# Patient Record
Sex: MALE | Race: WHITE | NOT HISPANIC OR LATINO | Employment: FULL TIME | URBAN - METROPOLITAN AREA
[De-identification: names, ages, dates, MRNs, and addresses within clinical notes are randomized per-mention and may not be internally consistent; named-entity substitution may affect disease eponyms.]

---

## 2017-01-31 ENCOUNTER — ALLSCRIPTS OFFICE VISIT (OUTPATIENT)
Dept: OTHER | Facility: OTHER | Age: 57
End: 2017-01-31

## 2017-08-10 ENCOUNTER — GENERIC CONVERSION - ENCOUNTER (OUTPATIENT)
Dept: OTHER | Facility: OTHER | Age: 57
End: 2017-08-10

## 2018-01-22 VITALS
DIASTOLIC BLOOD PRESSURE: 68 MMHG | HEART RATE: 63 BPM | HEIGHT: 72 IN | BODY MASS INDEX: 29.12 KG/M2 | WEIGHT: 215 LBS | SYSTOLIC BLOOD PRESSURE: 130 MMHG

## 2018-01-22 VITALS
HEART RATE: 54 BPM | DIASTOLIC BLOOD PRESSURE: 80 MMHG | SYSTOLIC BLOOD PRESSURE: 118 MMHG | BODY MASS INDEX: 29.12 KG/M2 | HEIGHT: 72 IN | WEIGHT: 215 LBS

## 2018-01-30 ENCOUNTER — OFFICE VISIT (OUTPATIENT)
Dept: FAMILY MEDICINE CLINIC | Facility: CLINIC | Age: 58
End: 2018-01-30

## 2018-02-20 PROBLEM — Z02.89 ENCOUNTER FOR FEDERAL AVIATION ADMINISTRATION (FAA) EXAMINATION: Status: ACTIVE | Noted: 2018-02-20

## 2018-08-30 ENCOUNTER — OFFICE VISIT (OUTPATIENT)
Dept: FAMILY MEDICINE CLINIC | Facility: CLINIC | Age: 58
End: 2018-08-30

## 2018-08-30 VITALS
WEIGHT: 210 LBS | DIASTOLIC BLOOD PRESSURE: 70 MMHG | HEIGHT: 74 IN | BODY MASS INDEX: 26.95 KG/M2 | SYSTOLIC BLOOD PRESSURE: 132 MMHG | HEART RATE: 53 BPM

## 2018-08-30 DIAGNOSIS — Z02.89 ENCOUNTER FOR FEDERAL AVIATION ADMINISTRATION (FAA) EXAMINATION: Primary | ICD-10-CM

## 2018-08-30 PROCEDURE — 99499 UNLISTED E&M SERVICE: CPT | Performed by: FAMILY MEDICINE

## 2018-08-30 NOTE — PROGRESS NOTES
Chief Complaint   Patient presents with    Physical Exam     1st [facility] NO MEDS/EKG   CONF #[id number]

## 2018-10-17 ENCOUNTER — EVALUATION (OUTPATIENT)
Dept: PHYSICAL THERAPY | Facility: REHABILITATION | Age: 58
End: 2018-10-17
Payer: COMMERCIAL

## 2018-10-17 DIAGNOSIS — M72.2 PLANTAR FASCIITIS OF LEFT FOOT: Primary | ICD-10-CM

## 2018-10-17 PROCEDURE — 97161 PT EVAL LOW COMPLEX 20 MIN: CPT | Performed by: PHYSICAL THERAPIST

## 2018-10-17 PROCEDURE — G8990 OTHER PT/OT CURRENT STATUS: HCPCS | Performed by: PHYSICAL THERAPIST

## 2018-10-17 PROCEDURE — G8991 OTHER PT/OT GOAL STATUS: HCPCS | Performed by: PHYSICAL THERAPIST

## 2018-10-17 PROCEDURE — 97110 THERAPEUTIC EXERCISES: CPT | Performed by: PHYSICAL THERAPIST

## 2018-10-17 NOTE — PROGRESS NOTES
PT Evaluation     Today's date: 10/17/2018  Patient name: Neha Presely  : 1960  MRN: [id number]  Referring provider: Ralph Kc DPM  Dx:   Encounter Diagnosis     ICD-10-CM    1  Plantar fasciitis of left foot M72 2        Start Time: 75  Stop Time:   Total time in clinic (min): 41 minutes    Assessment  Impairments: abnormal or restricted ROM, impaired physical strength, lacks appropriate home exercise program and pain with function    Assessment details: Patient presents complaining of primarily left heel pain, he reports the pain began on the right side about 8 months ago, he had cortizone injections done on his right heel and most of the pain decreased on that side  He had injections also done on left with limited relief of pain  He reports three injections on each foot to this point  He reports he has been icing it a lot and that helps out with the pain  He also has had TENS done to it wih pain relief  He notices a lot of pain with prolonged standing  He has tried all different orthotics and shoes and had limited pain relief to this point  He reports no numbness or tingling on either foot  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    Goals  ST- demonstrate compliancy with HEP in 1 week  Decrease pain to 4/10 @ worst and with activity, to improve functional capacity       LT- Improve FOTO score to specified value to improve patients perceived benefit of therapy, in 8 weeks   Improve L ankle range of motion to [facility] to return to more normal gait patter, within 8 weeks    Plan  Patient would benefit from: skilled physical therapy  Referral necessary: No  Planned modality interventions: cryotherapy, TENS and thermotherapy: hydrocollator packs  Planned therapy interventions: ADL training, balance, balance/weight bearing training, gait training, manual therapy, joint mobilization, neuromuscular re-education, strengthening, stretching, therapeutic activities and therapeutic exercise  Frequency: 2x week  Duration in weeks: 8  Plan of Care beginning date: 10/17/2018  Plan of Care expiration date: 2018  Treatment plan discussed with: patient  Plan details: Physical therapy with focus on there ex and manual therapy to improve ability to complete tasks around the house and complete functional activities, use of modalities as needed           Subjective Evaluation    History of Present Illness  Mechanism of injury: Chronic onset   Pain  Current pain ratin  At best pain ratin  At worst pain ratin  Location: L foot   Quality: burning and sharp  Relieving factors: ice, relaxation and rest    Treatments  Previous treatment: injection treatment  Patient Goals  Patient goals for therapy: decreased pain and independence with ADLs/IADLs  Patient goal: return to normal gym activity         Objective     General Comments     Ankle/Foot Comments   Ttp along insertion of plantar fascia on left side     rom  PF R=32 L= 27  DF R=8 L=12  Inv R=27 L= 20  Elsy R=23 L= 20     mmt  4+ strength B/L in all motions     Observation, general flat foot posture with pes planus     Special tests (-) anterior drawer     Joint play normal      Flowsheet Rows      Most Recent Value   PT/OT G-Codes   Current Score  40   Projected Score  58   FOTO information reviewed  Yes   Assessment Type  Evaluation   G code set  Other PT/OT Primary   Other PT Primary Current Status ()  CL   Other PT Primary Goal Status ()  CK          Precautions: none    Daily Treatment Diary     Manual              Gastroc/ TA stretching             IASTM                                                        Exercise Diary              Plantar fascia self stretch             TB 4way              prostretch             Inv/elsy sliding board             Bike--> TM             SLS ball throws Modalities              CP PRN

## 2018-10-17 NOTE — LETTER
2018    Iris Berry DPM  [address] [address]    Patient: Verito Nunez   YOB: 1960   Date of Visit: 10/17/2018     Encounter Diagnosis     ICD-10-CM    1  Plantar fasciitis of left foot M72 2        Dear Dr Courtney Terry:    Please review the attached Plan of Care from [place] [facility] recent visit  Please verify that you agree therapy should continue by signing the attached document and sending it back to our office  If you have any questions or concerns, please don't hesitate to call  Sincerely,    Ed Benjamin, PT      Referring Provider:      I certify that I have read the below Plan of Care and certify the need for these services furnished under this plan of treatment while under my care  Iris Berry DPM  [address] [place] [address]  [employer] [employer]          PT Evaluation     Today's date: 10/17/2018  Patient name: Verito Nunez  : 1960  MRN: [id number]  Referring provider: Raeford Apgar, DPM  Dx:   Encounter Diagnosis     ICD-10-CM    1  Plantar fasciitis of left foot M72 2        Start Time: 8440  Stop Time:   Total time in clinic (min): 41 minutes    Assessment  Impairments: abnormal or restricted ROM, impaired physical strength, lacks appropriate home exercise program and pain with function    Assessment details: Patient presents complaining of primarily left heel pain, he reports the pain began on the right side about 8 months ago, he had cortizone injections done on his right heel and most of the pain decreased on that side  He had injections also done on left with limited relief of pain  He reports three injections on each foot to this point  He reports he has been icing it a lot and that helps out with the pain  He also has had TENS done to it wih pain relief  He notices a lot of pain with prolonged standing  He has tried all different orthotics and shoes and had limited pain relief to this point   He reports no numbness or tingling on either foot  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    Goals  ST- demonstrate compliancy with HEP in 1 week  Decrease pain to 4/10 @ worst and with activity, to improve functional capacity  LT- Improve FOTO score to specified value to improve patients perceived benefit of therapy, in 8 weeks   Improve L ankle range of motion to [facility] to return to more normal gait patter, within 8 weeks    Plan  Patient would benefit from: skilled physical therapy  Referral necessary: No  Planned modality interventions: cryotherapy, TENS and thermotherapy: hydrocollator packs  Planned therapy interventions: ADL training, balance, balance/weight bearing training, gait training, manual therapy, joint mobilization, neuromuscular re-education, strengthening, stretching, therapeutic activities and therapeutic exercise  Frequency: 2x week  Duration in weeks: 8  Plan of Care beginning date: 10/17/2018  Plan of Care expiration date: 2018  Treatment plan discussed with: patient  Plan details: Physical therapy with focus on there ex and manual therapy to improve ability to complete tasks around the house and complete functional activities, use of modalities as needed           Subjective Evaluation    History of Present Illness  Mechanism of injury: Chronic onset   Pain  Current pain ratin  At best pain ratin  At worst pain ratin  Location: L foot   Quality: burning and sharp  Relieving factors: ice, relaxation and rest    Treatments  Previous treatment: injection treatment  Patient Goals  Patient goals for therapy: decreased pain and independence with ADLs/IADLs  Patient goal: return to normal gym activity         Objective     General Comments     Ankle/Foot Comments   Ttp along insertion of plantar fascia on left side     rom  PF R=32 L= 27  DF R=8 L=12  Inv R=27 L= 20  Elsy R=23 L= 20     mmt  4+ strength B/L in all motions     Observation, general flat foot posture with pes planus     Special tests (-) anterior drawer     Joint play normal      Flowsheet Rows      Most Recent Value   PT/OT G-Codes   Current Score  40   Projected Score  58   FOTO information reviewed  Yes   Assessment Type  Evaluation   G code set  Other PT/OT Primary   Other PT Primary Current Status ()  CL   Other PT Primary Goal Status ()  CK          Precautions: none    Daily Treatment Diary     Manual              Gastroc/ TA stretching             IASTM                                                        Exercise Diary              Plantar fascia self stretch             TB 4way              prostretch             Inv/josh sliding board             Bike--> TM             SLS ball throws                                                                                                                                                                                                        Modalities              CP PRN

## 2018-10-23 ENCOUNTER — OFFICE VISIT (OUTPATIENT)
Dept: PHYSICAL THERAPY | Facility: REHABILITATION | Age: 58
End: 2018-10-23
Payer: COMMERCIAL

## 2018-10-23 DIAGNOSIS — M72.2 PLANTAR FASCIITIS OF LEFT FOOT: Primary | ICD-10-CM

## 2018-10-23 PROCEDURE — 97112 NEUROMUSCULAR REEDUCATION: CPT | Performed by: PHYSICAL MEDICINE & REHABILITATION

## 2018-10-23 PROCEDURE — 97140 MANUAL THERAPY 1/> REGIONS: CPT | Performed by: PHYSICAL MEDICINE & REHABILITATION

## 2018-10-23 PROCEDURE — 97110 THERAPEUTIC EXERCISES: CPT | Performed by: PHYSICAL MEDICINE & REHABILITATION

## 2018-10-23 NOTE — PROGRESS NOTES
Daily Note     Today's date: 10/23/2018  Patient name: Mora Osullivan  : 1960  MRN: [id number]  Referring provider: Huseyin Chin DPM  Dx:   Encounter Diagnosis     ICD-10-CM    1  Plantar fasciitis of left foot M72 2                   Subjective: Patient presents with complaints of left foot pain, notes "it's a bad day " Patient reports 3-4/10 pain while walking  Objective: See treatment diary below    Precautions: none    Daily Treatment Diary     Manual  10/23            Gastroc/ TA stretching             [facility]                                                       Exercise Diary  10/23            Plantar fascia self stretch 3x20"            TB 4way  GTB 2x10            prostretch 3x20" gastroc/soleus            Inv/ojsh sliding board             Bike--> TM 6'            SLS ball throws                                                                                                                                                                                                        Modalities  10/23            CP PRN np                                            Assessment: Tolerated treatment well  Restrictions noted throughout plantar fascia, goos tissue response to IASTM  Patient exhibited good technique with therapeutic exercises and would benefit from continued PT  Plan: Progress treatment as tolerated

## 2018-10-30 ENCOUNTER — APPOINTMENT (OUTPATIENT)
Dept: PHYSICAL THERAPY | Facility: REHABILITATION | Age: 58
End: 2018-10-30
Payer: COMMERCIAL

## 2018-10-31 ENCOUNTER — APPOINTMENT (OUTPATIENT)
Dept: PHYSICAL THERAPY | Facility: REHABILITATION | Age: 58
End: 2018-10-31
Payer: COMMERCIAL

## 2018-11-01 ENCOUNTER — OFFICE VISIT (OUTPATIENT)
Dept: PHYSICAL THERAPY | Facility: REHABILITATION | Age: 58
End: 2018-11-01
Payer: COMMERCIAL

## 2018-11-01 DIAGNOSIS — M72.2 PLANTAR FASCIITIS OF LEFT FOOT: Primary | ICD-10-CM

## 2018-11-01 PROCEDURE — 97112 NEUROMUSCULAR REEDUCATION: CPT

## 2018-11-01 NOTE — PROGRESS NOTES
Daily Note     Today's date: 2018  Patient name: Melina Christensen  : 1960  MRN: [id number]  Referring provider: Reyna Woodard DPM  Dx:   Encounter Diagnosis     ICD-10-CM    1  Plantar fasciitis of left foot M72 2                   Subjective: Pt reports that foot is feeling good upon arrival today, but still has intermittent pain throughout day      Objective: See treatment diary below      Precautions: none    Daily Treatment Diary     Manual  10/23 11/1           Gastroc/ TA stretching             [facility]                                                      Exercise Diary  10/23 11/1           Plantar fascia self stretch 3x20" 3x30"           TB 4way  GTB 2x10 GTB 30 ea           prostretch 3x20" gastroc/soleus 3x30"           Inv/josh sliding board             Bike--> TM 6' 7'           SLS ball throws   30 L                                                                                                                                                                                                     Modalities  10/23            CP PRN np                                          Assessment: Tolerated treatment well  Patient would benefit from continued PT  Noted some restrictions with ISTM, mainly anteriorly to heel, in longitudinal arch  Pt  able to complete all exercises with no increase in pain during or after session  Pt 1:1 with PTA [id number], IEP for remainder  Plan: Continue per plan of care

## 2018-11-05 ENCOUNTER — OFFICE VISIT (OUTPATIENT)
Dept: PHYSICAL THERAPY | Facility: REHABILITATION | Age: 58
End: 2018-11-05
Payer: COMMERCIAL

## 2018-11-05 DIAGNOSIS — M72.2 PLANTAR FASCIITIS OF LEFT FOOT: Primary | ICD-10-CM

## 2018-11-05 PROCEDURE — 97110 THERAPEUTIC EXERCISES: CPT

## 2018-11-05 PROCEDURE — 97140 MANUAL THERAPY 1/> REGIONS: CPT

## 2018-11-05 PROCEDURE — 97112 NEUROMUSCULAR REEDUCATION: CPT

## 2018-11-05 NOTE — PROGRESS NOTES
Daily Note     Today's date: 2018  Patient name: Melina Christensen  : 1960  MRN: [id number]  Referring provider: Reyna Woodard DPM  Dx:   Encounter Diagnosis     ICD-10-CM    1  Plantar fasciitis of left foot M72 2                   Subjective: Pt reports he was able to increase exercises at home without being limited by foot pain this weekend      Objective: See treatment diary below      Precautions: none    Daily Treatment Diary     Manual  10/23 11/1 11/5          Gastroc/ TA stretching             [facility]                                                     Exercise Diary  10/23 11/1 11/5          Plantar fascia self stretch 3x20" 3x30" 3x30"          TB 4way  GTB 2x10 GTB 30 ea GTB 30 ea          prostretch 3x20" gastroc/soleus 3x30" 3x30"          Inv/josh sliding board             Bike--> TM 6' 7' 8'          SLS ball throws   30 L 30 L                                                                                                                                                                                                    Modalities  10/23            CP PRN np                                            Assessment: Tolerated treatment well  Patient would benefit from continued PT  Pt did well with exercises this session, had increased tolerance to ISTM  Pt  able to complete all exercises with no increase in pain during or after session  Pt 1:1 with PTA for entirety  Plan: Continue per plan of care

## 2018-11-08 ENCOUNTER — OFFICE VISIT (OUTPATIENT)
Dept: PHYSICAL THERAPY | Facility: REHABILITATION | Age: 58
End: 2018-11-08
Payer: COMMERCIAL

## 2018-11-08 DIAGNOSIS — M72.2 PLANTAR FASCIITIS OF LEFT FOOT: Primary | ICD-10-CM

## 2018-11-08 PROCEDURE — 97110 THERAPEUTIC EXERCISES: CPT

## 2018-11-08 PROCEDURE — 97112 NEUROMUSCULAR REEDUCATION: CPT

## 2018-11-08 PROCEDURE — 97140 MANUAL THERAPY 1/> REGIONS: CPT

## 2018-11-08 NOTE — PROGRESS NOTES
Daily Note     Today's date: 2018  Patient name: Hilda Mandujano  : 1960  MRN: [id number]  Referring provider: Sara Bryant DPM  Dx:   Encounter Diagnosis     ICD-10-CM    1  Plantar fasciitis of left foot M72 2                   Subjective: Pt reports increased pain today, says he had increased activity level yesterday and exercised later in the day  Objective: See treatment diary below      Precautions: none    Daily Treatment Diary     Manual  10/23 11/1 11/5 11/8         Gastroc/ TA stretching             IASTM [facility]                                                    Exercise Diary  10/23 11/1 11/5 11/8         Plantar fascia self stretch 3x20" 3x30" 3x30" 3x30"         TB 4way  GTB 2x10 GTB 30 ea GTB 30 ea GTB 30 ea         prostretch 3x20" gastroc/soleus 3x30" 3x30" 3x30"         Inv/josh sliding board             Bike--> TM 6' 7' 8' 10'          SLS ball throws   30 L 30 L 30L                                                                                                                                                                                                   Modalities  10/23            CP PRN np                                            Assessment: Tolerated treatment well  Patient would benefit from continued PT  Performed ISTM at beginning of session after bike warmup, pt responded well  Noted restrictions that resolved, pt noted some relief after ISTM and extended stretching  Pt  able to complete all exercises with no increase in pain during or after session  Pt  1:1 with PTA for entirety  Plan: Continue per plan of care

## 2018-11-12 ENCOUNTER — OFFICE VISIT (OUTPATIENT)
Dept: PHYSICAL THERAPY | Facility: REHABILITATION | Age: 58
End: 2018-11-12
Payer: COMMERCIAL

## 2018-11-12 DIAGNOSIS — M72.2 PLANTAR FASCIITIS OF LEFT FOOT: Primary | ICD-10-CM

## 2018-11-12 PROCEDURE — G8990 OTHER PT/OT CURRENT STATUS: HCPCS | Performed by: PHYSICAL THERAPIST

## 2018-11-12 PROCEDURE — 97140 MANUAL THERAPY 1/> REGIONS: CPT | Performed by: PHYSICAL THERAPIST

## 2018-11-12 PROCEDURE — 97110 THERAPEUTIC EXERCISES: CPT | Performed by: PHYSICAL THERAPIST

## 2018-11-12 PROCEDURE — 97112 NEUROMUSCULAR REEDUCATION: CPT | Performed by: PHYSICAL THERAPIST

## 2018-11-12 PROCEDURE — G8991 OTHER PT/OT GOAL STATUS: HCPCS | Performed by: PHYSICAL THERAPIST

## 2018-11-12 NOTE — PROGRESS NOTES
Daily Note     Today's date: 2018  Patient name: Ritu Tate  : 1960  MRN: [id number]  Referring provider: Anant Wyman DPM  Dx:   Encounter Diagnosis     ICD-10-CM    1  Plantar fasciitis of left foot M72 2                   Subjective: Reports his heel pain increased the last couple days after increasing his workout this past weekend  Objective: See treatment diary below    Precautions: none    Daily Treatment Diary     Manual  10/23 11/1 11/5 11/8 11/12        Gastroc/ TA stretching    KP CW        IASTM LH KP KP KP CW                                                   Exercise Diary  10/23 11/1 11/5 11/8 11/12        Plantar fascia self stretch 3x20" 3x30" 3x30" 3x30" 3x30"        TB 4way  GTB 2x10 GTB 30 ea GTB 30 ea GTB 30 ea GTB 30ea         prostretch 3x20" gastroc/soleus 3x30" 3x30" 3x30" 5x30"        Inv/josh sliding board     nv        Bike--> TM 6' 7' 8' 10'  10'        SLS ball throws   30 L 30 L 30L 30 L                                                                                                                                                                                                   Modalities  10/23            CP PRN np                                            Assessment: Tolerated treatment well  Patient exhibited good technique with therapeutic exercises and would benefit from continued PT, did well with exercises today and noted increased DF ROM on the L side today  Plan: Progress treatment as tolerated

## 2018-11-20 ENCOUNTER — OFFICE VISIT (OUTPATIENT)
Dept: PHYSICAL THERAPY | Facility: REHABILITATION | Age: 58
End: 2018-11-20
Payer: COMMERCIAL

## 2018-11-20 DIAGNOSIS — M72.2 PLANTAR FASCIITIS OF LEFT FOOT: Primary | ICD-10-CM

## 2018-11-20 PROCEDURE — 97112 NEUROMUSCULAR REEDUCATION: CPT

## 2018-11-20 PROCEDURE — 97110 THERAPEUTIC EXERCISES: CPT

## 2018-11-20 NOTE — PROGRESS NOTES
Daily Note     Today's date: 2018  Patient name: Selina Hays  : 1960  MRN: [id number]  Referring provider: Judy Issa DPM  Dx:   Encounter Diagnosis     ICD-10-CM    1  Plantar fasciitis of left foot M72 2                   Subjective: Pt reports feet are feeling good upon arrival      Objective: See treatment diary below      Precautions: none    Daily Treatment Diary     Manual  10/23 11/1 11/5 11/8 11/12 11/20       Gastroc/ TA stretching    KP CW KP       IASTM LH [facility] KP CW decl                                                  Exercise Diary  10/23 11/1 11/5 11/8 11/12 11/20       Plantar fascia self stretch 3x20" 3x30" 3x30" 3x30" 3x30" 3x30"       TB 4way  GTB 2x10 GTB 30 ea GTB 30 ea GTB 30 ea GTB 30ea  BTB 30 ea       prostretch 3x20" gastroc/soleus 3x30" 3x30" 3x30" 5x30" 5x30"       Inv/josh sliding board     nv        Bike--> TM 6' 7' 8' 10'  10' 10'       SLS ball throws   30 L 30 L 30L 30 L  30L                                                                                                                                                                                                  Modalities  10/23            CP PRN np                                            Assessment: Tolerated treatment well  Patient exhibited good technique with therapeutic exercises  Reviewed HEP with pt, pt understood  Discussed possible d/c [place] with pt  Pt declined ISTM this session due to having a long day on his feet tomorrow  Pt  able to complete all exercises with no increase in pain during or after session  Pt 1:1 with PTA [id number], IEP for remainder  Plan: Potential discharge next visit

## 2018-11-27 ENCOUNTER — EVALUATION (OUTPATIENT)
Dept: PHYSICAL THERAPY | Facility: REHABILITATION | Age: 58
End: 2018-11-27
Payer: COMMERCIAL

## 2018-11-27 DIAGNOSIS — M72.2 PLANTAR FASCIITIS OF LEFT FOOT: Primary | ICD-10-CM

## 2018-11-27 PROCEDURE — G8992 OTHER PT/OT  D/C STATUS: HCPCS | Performed by: PHYSICAL THERAPIST

## 2018-11-27 PROCEDURE — 97110 THERAPEUTIC EXERCISES: CPT | Performed by: PHYSICAL THERAPIST

## 2018-11-27 PROCEDURE — 97140 MANUAL THERAPY 1/> REGIONS: CPT | Performed by: PHYSICAL THERAPIST

## 2018-11-27 PROCEDURE — G8990 OTHER PT/OT CURRENT STATUS: HCPCS | Performed by: PHYSICAL THERAPIST

## 2018-11-27 PROCEDURE — 97112 NEUROMUSCULAR REEDUCATION: CPT | Performed by: PHYSICAL THERAPIST

## 2018-11-27 PROCEDURE — G8991 OTHER PT/OT GOAL STATUS: HCPCS | Performed by: PHYSICAL THERAPIST

## 2018-11-27 NOTE — PROGRESS NOTES
Daily Note     Today's date: 2018  Patient name: Jose Jj  : 1960  MRN: [id number]  Referring provider: Pino Handley DPM  Dx:   Encounter Diagnosis     ICD-10-CM    1  Plantar fasciitis of left foot M72 2                   Assessment  Impairments: abnormal or restricted ROM, impaired physical strength, lacks appropriate home exercise program and pain with function    Assessment details: Patient reports that his foot feels much better and will now be transitioned to HEP, he has purchased a prostretch, and will continue with stretching and exercises at home  Prognosis: good    Goals  ST- demonstrate compliancy with HEP in 1 week  met  Decrease pain to 4/10 @ worst and with activity, to improve functional capacity  met    LT- Improve FOTO score to specified value to improve patients perceived benefit of therapy, in 8 weeks met  Improve L ankle range of motion to [facility] to return to more normal gait patter, within 8 weeks    Plan  Patient would benefit from: skilled physical therapy  Referral necessary: No  Planned modality interventions: cryotherapy, TENS and thermotherapy: hydrocollator packs  Planned therapy interventions: ADL training, balance, balance/weight bearing training, gait training, manual therapy, joint mobilization, neuromuscular re-education, strengthening, stretching, therapeutic activities and therapeutic exercise  Frequency: 2x week  Duration in weeks: 8  Plan of Care beginning date: 10/17/2018  Plan of Care expiration date: 2018  Treatment plan discussed with: patient  Plan details: Physical therapy with focus on there ex and manual therapy to improve ability to complete tasks around the house and complete functional activities, use of modalities as needed           Subjective Evaluation    History of Present Illness  Mechanism of injury: Chronic onset   Pain  Current pain ratin  At best pain ratin  At worst pain ratin  Location: L foot   Quality: burning and sharp  Relieving factors: ice, relaxation and rest    Treatments  Previous treatment: injection treatment  Patient Goals  Patient goals for therapy: decreased pain and independence with ADLs/IADLs  Patient goal: return to normal gym activity         Objective     General Comments     Ankle/Foot Comments   Ttp along insertion of plantar fascia on left side     rom  PF R=32 L=  42  DF R=8 L= 15  Inv R=27 L= 32  Elsy R=23 L=  23    mmt  4+/5 strength B/L in all motions     Observation, general flat foot posture with pes planus     Special tests (-) anterior drawer     Joint play normal      Precautions: none    Daily Treatment Diary     Manual  10/23 11/1 11/5 11/8 11/12 11/20 11/27      Gastroc/ TA stretching    KP CW KP np      IASTM LH KP KP KP CW decl CW                                                 Exercise Diary  10/23 11/1 11/5 11/8 11/12 11/20 11/27      Plantar fascia self stretch 3x20" 3x30" 3x30" 3x30" 3x30" 3x30" np      TB 4way  GTB 2x10 GTB 30 ea GTB 30 ea GTB 30 ea GTB 30ea  BTB 30 ea np      prostretch 3x20" gastroc/soleus 3x30" 3x30" 3x30" 5x30" 5x30" 3x30"      Inv/elsy sliding board     nv        Bike--> TM 6' 7' 8' 10'  10' 10' 5'      SLS ball throws   30 L 30 L 30L 30 L  30L  3x30 L                                                                                                                                                                                                Modalities  10/23            CP PRN np

## 2019-02-27 ENCOUNTER — OFFICE VISIT (OUTPATIENT)
Dept: FAMILY MEDICINE CLINIC | Facility: CLINIC | Age: 59
End: 2019-02-27

## 2019-02-27 VITALS
SYSTOLIC BLOOD PRESSURE: 130 MMHG | DIASTOLIC BLOOD PRESSURE: 88 MMHG | WEIGHT: 210 LBS | BODY MASS INDEX: 26.95 KG/M2 | HEIGHT: 74 IN | HEART RATE: 55 BPM

## 2019-02-27 DIAGNOSIS — Z02.89 ENCOUNTER FOR FEDERAL AVIATION ADMINISTRATION (FAA) EXAMINATION: Primary | ICD-10-CM

## 2019-02-27 PROCEDURE — 99499 UNLISTED E&M SERVICE: CPT | Performed by: FAMILY MEDICINE

## 2019-02-27 PROCEDURE — 93000 ELECTROCARDIOGRAM COMPLETE: CPT | Performed by: FAMILY MEDICINE

## 2019-02-27 NOTE — PROGRESS NOTES
Chief Complaint   Patient presents with    Physical Exam     FAA 1st class (with EKG) conformation #[id number]  Corrected vision  No meds

## 2019-05-30 ENCOUNTER — APPOINTMENT (OUTPATIENT)
Dept: RADIOLOGY | Facility: OTHER | Age: 59
End: 2019-05-30
Payer: COMMERCIAL

## 2019-05-30 VITALS
HEART RATE: 97 BPM | HEIGHT: 74 IN | BODY MASS INDEX: 27.34 KG/M2 | WEIGHT: 213 LBS | SYSTOLIC BLOOD PRESSURE: 128 MMHG | DIASTOLIC BLOOD PRESSURE: 90 MMHG

## 2019-05-30 DIAGNOSIS — M25.511 RIGHT SHOULDER PAIN, UNSPECIFIED CHRONICITY: ICD-10-CM

## 2019-05-30 DIAGNOSIS — S42.021A CLOSED DISPLACED FRACTURE OF SHAFT OF RIGHT CLAVICLE, INITIAL ENCOUNTER: Primary | ICD-10-CM

## 2019-05-30 PROCEDURE — 73000 X-RAY EXAM OF COLLAR BONE: CPT

## 2019-05-30 PROCEDURE — 23500 CLTX CLAVICULAR FX W/O MNPJ: CPT | Performed by: ORTHOPAEDIC SURGERY

## 2019-05-30 PROCEDURE — 99203 OFFICE O/P NEW LOW 30 MIN: CPT | Performed by: ORTHOPAEDIC SURGERY

## 2019-05-30 RX ORDER — METHOCARBAMOL 500 MG/1
500 TABLET, FILM COATED ORAL 3 TIMES DAILY
Refills: 0 | COMMUNITY
Start: 2019-05-18 | End: 2019-05-30

## 2019-05-30 RX ORDER — IBUPROFEN 200 MG
200 TABLET ORAL
COMMUNITY
End: 2021-06-01 | Stop reason: ALTCHOICE

## 2019-05-30 RX ORDER — HYDROCODONE BITARTRATE AND ACETAMINOPHEN 5; 325 MG/1; MG/1
1 TABLET ORAL EVERY 6 HOURS PRN
Refills: 0 | COMMUNITY
Start: 2019-05-18 | End: 2019-05-30

## 2019-07-02 ENCOUNTER — OFFICE VISIT (OUTPATIENT)
Dept: OBGYN CLINIC | Facility: OTHER | Age: 59
End: 2019-07-02

## 2019-07-02 ENCOUNTER — APPOINTMENT (OUTPATIENT)
Dept: RADIOLOGY | Facility: OTHER | Age: 59
End: 2019-07-02
Payer: COMMERCIAL

## 2019-07-02 VITALS
DIASTOLIC BLOOD PRESSURE: 91 MMHG | SYSTOLIC BLOOD PRESSURE: 135 MMHG | WEIGHT: 210 LBS | BODY MASS INDEX: 26.95 KG/M2 | HEIGHT: 74 IN | HEART RATE: 62 BPM

## 2019-07-02 DIAGNOSIS — S42.021D CLOSED DISPLACED FRACTURE OF SHAFT OF RIGHT CLAVICLE WITH ROUTINE HEALING, SUBSEQUENT ENCOUNTER: ICD-10-CM

## 2019-07-02 DIAGNOSIS — M89.8X1 PAIN OF RIGHT CLAVICLE: Primary | ICD-10-CM

## 2019-07-02 DIAGNOSIS — M89.8X1 PAIN OF RIGHT CLAVICLE: ICD-10-CM

## 2019-07-02 PROCEDURE — 73000 X-RAY EXAM OF COLLAR BONE: CPT

## 2019-07-02 PROCEDURE — 99024 POSTOP FOLLOW-UP VISIT: CPT | Performed by: PHYSICIAN ASSISTANT

## 2019-07-02 NOTE — PROGRESS NOTES
Injury: right displaced clavicle shaft fracture    S: Raya Rodriguez is having less pain in the right shoulder  He has near full range of motion with pain in cross body and reaching only  He denies pain that interferes with sleep or work  Denies new injury or trauma  /91   Pulse 62   Ht 6' 2" (1 88 m)   Wt 95 3 kg (210 lb) Comment: verbal  BMI 26 96 kg/m²     O: Right shoulder  Minimal tenderness across fracture site  Full FF and abduction  IR to right buttock and left anterior chest with cross body  SI  NVI    2 views R clavicle: some callous present,  No bridging yet  No further displacement    A/P: 6 weeks s/p Right clavicle shaft fracture  1  Slowly increase activities to tolerance  2  Range of motion  3  Follow up in 6 weeks    Repeat x-rays R clavicle required

## 2019-08-08 ENCOUNTER — OFFICE VISIT (OUTPATIENT)
Dept: OBGYN CLINIC | Facility: OTHER | Age: 59
End: 2019-08-08

## 2019-08-08 ENCOUNTER — APPOINTMENT (OUTPATIENT)
Dept: RADIOLOGY | Facility: OTHER | Age: 59
End: 2019-08-08
Payer: COMMERCIAL

## 2019-08-08 VITALS
SYSTOLIC BLOOD PRESSURE: 152 MMHG | BODY MASS INDEX: 27.21 KG/M2 | HEIGHT: 74 IN | HEART RATE: 70 BPM | DIASTOLIC BLOOD PRESSURE: 84 MMHG | WEIGHT: 212 LBS

## 2019-08-08 DIAGNOSIS — S42.021D CLOSED DISPLACED FRACTURE OF SHAFT OF RIGHT CLAVICLE WITH ROUTINE HEALING, SUBSEQUENT ENCOUNTER: ICD-10-CM

## 2019-08-08 DIAGNOSIS — S42.021D CLOSED DISPLACED FRACTURE OF SHAFT OF RIGHT CLAVICLE WITH ROUTINE HEALING, SUBSEQUENT ENCOUNTER: Primary | ICD-10-CM

## 2019-08-08 PROCEDURE — 99024 POSTOP FOLLOW-UP VISIT: CPT | Performed by: PHYSICIAN ASSISTANT

## 2019-08-08 PROCEDURE — 73000 X-RAY EXAM OF COLLAR BONE: CPT

## 2019-08-08 NOTE — PROGRESS NOTES
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

## 2019-08-15 ENCOUNTER — OFFICE VISIT (OUTPATIENT)
Dept: FAMILY MEDICINE CLINIC | Facility: CLINIC | Age: 59
End: 2019-08-15

## 2019-08-15 VITALS
DIASTOLIC BLOOD PRESSURE: 88 MMHG | BODY MASS INDEX: 27.59 KG/M2 | HEART RATE: 75 BPM | HEIGHT: 74 IN | WEIGHT: 215 LBS | SYSTOLIC BLOOD PRESSURE: 128 MMHG

## 2019-08-15 DIAGNOSIS — Z02.89 ENCOUNTER FOR FEDERAL AVIATION ADMINISTRATION (FAA) EXAMINATION: Primary | ICD-10-CM

## 2019-08-15 PROCEDURE — 99499 UNLISTED E&M SERVICE: CPT | Performed by: FAMILY MEDICINE

## 2019-08-15 NOTE — PROGRESS NOTES
Chief Complaint   Patient presents with    Physical Exam     FAA 1st class (NO EKG) [facility] #[id number] Corrected vision  No meds  Spontaneous vertex

## 2020-01-24 ENCOUNTER — OFFICE VISIT (OUTPATIENT)
Dept: URGENT CARE | Age: 60
End: 2020-01-24
Payer: COMMERCIAL

## 2020-01-24 VITALS
WEIGHT: 213 LBS | SYSTOLIC BLOOD PRESSURE: 130 MMHG | BODY MASS INDEX: 27.34 KG/M2 | RESPIRATION RATE: 15 BRPM | HEIGHT: 74 IN | HEART RATE: 65 BPM | OXYGEN SATURATION: 93 % | DIASTOLIC BLOOD PRESSURE: 90 MMHG | TEMPERATURE: 98.7 F

## 2020-01-24 DIAGNOSIS — J18.9 PNEUMONIA OF RIGHT LUNG DUE TO INFECTIOUS ORGANISM, UNSPECIFIED PART OF LUNG: Primary | ICD-10-CM

## 2020-01-24 PROCEDURE — G0382 LEV 3 HOSP TYPE B ED VISIT: HCPCS | Performed by: PHYSICIAN ASSISTANT

## 2020-01-24 PROCEDURE — 99283 EMERGENCY DEPT VISIT LOW MDM: CPT | Performed by: PHYSICIAN ASSISTANT

## 2020-01-24 RX ORDER — LEVOFLOXACIN 500 MG/1
500 TABLET, FILM COATED ORAL EVERY 24 HOURS
Qty: 10 TABLET | Refills: 0 | Status: SHIPPED | OUTPATIENT
Start: 2020-01-24 | End: 2020-02-03

## 2020-01-25 NOTE — PROGRESS NOTES
[facility]        NAME: Dalton Traore is a 61 y o  male  : 1960    MRN: [id number]  DATE: 2020  TIME: 10:00 PM    Assessment and Plan   Pneumonia of right lung due to infectious organism, unspecified part of lung [J18 9]  1  Pneumonia of right lung due to infectious organism, unspecified part of lung  levofloxacin (LEVAQUIN) 500 mg tablet         Patient Instructions       Follow up with PCP in 3-5 days  Proceed to  ER if symptoms worsen  Chief Complaint     Chief Complaint   Patient presents with    Cough     Terrible cough; 12 days;     Sore Throat     from coughing worse past 8 days     Earache     on occasion          History of Present Illness       Patient here for evaluation of persistent cough, chest congestion, nasal congestion, runny nose  Symptoms have been present for the past 12 days  He is bring up occasional sputum  Review of Systems   Review of Systems   Constitutional: Negative  HENT: Positive for ear pain and sore throat  Negative for congestion, postnasal drip, rhinorrhea, sinus pressure, sinus pain and trouble swallowing  Eyes: Negative  Respiratory: Positive for cough  Negative for chest tightness, shortness of breath and wheezing  Cardiovascular: Negative  Current Medications       Current Outpatient Medications:     ibuprofen (MOTRIN) 200 mg tablet, Take 200 mg by mouth, Disp: , Rfl:     levofloxacin (LEVAQUIN) 500 mg tablet, Take 1 tablet (500 mg total) by mouth every 24 hours for 10 days, Disp: 10 tablet, Rfl: 0    Current Allergies     Allergies as of 2020    (No Known Allergies)            The following portions of the patient's history were reviewed and updated as appropriate: allergies, current medications, past family history, past medical history, past social history, past surgical history and problem list      History reviewed  No pertinent past medical history      Past Surgical History:   Procedure Laterality Date    APPENDECTOMY         Family History   Problem Relation Age of Onset    Cancer Mother     Heart disease Father          Medications have been verified  Objective   /90   Pulse 65   Temp 98 7 °F (37 1 °C) (Temporal)   Resp 15   Ht 6' 2" (1 88 m)   Wt 96 6 kg (213 lb)   SpO2 93%   BMI 27 35 kg/m²        Physical Exam     Physical Exam   Constitutional: He is oriented to person, place, and time  He appears well-developed and well-nourished  No distress  HENT:   Head: Normocephalic and atraumatic  Right Ear: External ear normal    Left Ear: External ear normal    Mouth/Throat: Oropharynx is clear and moist  No oropharyngeal exudate  Bilateral nasal congestion mild erythema with no discharge  Eyes: Pupils are equal, round, and reactive to light  Conjunctivae and EOM are normal  Right eye exhibits no discharge  Left eye exhibits no discharge  Cardiovascular: Normal rate, regular rhythm and normal heart sounds  No murmur heard  Pulmonary/Chest: Effort normal  No stridor  No respiratory distress  He has no wheezes  He has no rales  Rhonchi present on the right mid lower lung  No rales  No wheezes  Lymphadenopathy:     He has no cervical adenopathy  Neurological: He is alert and oriented to person, place, and time  Skin: Skin is warm and dry  Capillary refill takes less than 2 seconds  No rash noted  He is not diaphoretic  Psychiatric: He has a normal mood and affect  His behavior is normal  Judgment and thought content normal    Nursing note and vitals reviewed

## 2020-11-30 ENCOUNTER — OFFICE VISIT (OUTPATIENT)
Dept: FAMILY MEDICINE CLINIC | Facility: CLINIC | Age: 60
End: 2020-11-30

## 2020-11-30 VITALS
BODY MASS INDEX: 27.85 KG/M2 | OXYGEN SATURATION: 98 % | WEIGHT: 217 LBS | DIASTOLIC BLOOD PRESSURE: 70 MMHG | HEART RATE: 57 BPM | SYSTOLIC BLOOD PRESSURE: 130 MMHG | HEIGHT: 74 IN

## 2020-11-30 DIAGNOSIS — Z02.89 ENCOUNTER FOR FEDERAL AVIATION ADMINISTRATION (FAA) EXAMINATION: Primary | ICD-10-CM

## 2020-11-30 PROCEDURE — 99499 UNLISTED E&M SERVICE: CPT | Performed by: FAMILY MEDICINE

## 2021-04-02 DIAGNOSIS — Z23 ENCOUNTER FOR IMMUNIZATION: ICD-10-CM

## 2021-04-14 ENCOUNTER — IMMUNIZATIONS (OUTPATIENT)
Dept: FAMILY MEDICINE CLINIC | Facility: HOSPITAL | Age: 61
End: 2021-04-14

## 2021-04-14 DIAGNOSIS — Z23 ENCOUNTER FOR IMMUNIZATION: Primary | ICD-10-CM

## 2021-04-14 PROCEDURE — 91300 SARS-COV-2 / COVID-19 MRNA VACCINE (PFIZER-BIONTECH) 30 MCG: CPT

## 2021-04-14 PROCEDURE — 0001A SARS-COV-2 / COVID-19 MRNA VACCINE (PFIZER-BIONTECH) 30 MCG: CPT

## 2021-05-06 ENCOUNTER — IMMUNIZATIONS (OUTPATIENT)
Dept: FAMILY MEDICINE CLINIC | Facility: HOSPITAL | Age: 61
End: 2021-05-06

## 2021-05-06 DIAGNOSIS — Z23 ENCOUNTER FOR IMMUNIZATION: Primary | ICD-10-CM

## 2021-05-06 PROCEDURE — 0002A SARS-COV-2 / COVID-19 MRNA VACCINE (PFIZER-BIONTECH) 30 MCG: CPT

## 2021-05-06 PROCEDURE — 91300 SARS-COV-2 / COVID-19 MRNA VACCINE (PFIZER-BIONTECH) 30 MCG: CPT

## 2021-05-12 ENCOUNTER — OFFICE VISIT (OUTPATIENT)
Dept: FAMILY MEDICINE CLINIC | Facility: CLINIC | Age: 61
End: 2021-05-12

## 2021-05-12 VITALS
SYSTOLIC BLOOD PRESSURE: 120 MMHG | HEART RATE: 53 BPM | HEIGHT: 74 IN | BODY MASS INDEX: 27.46 KG/M2 | WEIGHT: 214 LBS | DIASTOLIC BLOOD PRESSURE: 80 MMHG

## 2021-05-12 DIAGNOSIS — Z02.89 ENCOUNTER FOR FEDERAL AVIATION ADMINISTRATION (FAA) EXAMINATION: Primary | ICD-10-CM

## 2021-05-12 PROCEDURE — 99499 UNLISTED E&M SERVICE: CPT | Performed by: FAMILY MEDICINE

## 2021-05-12 PROCEDURE — 93000 ELECTROCARDIOGRAM COMPLETE: CPT | Performed by: FAMILY MEDICINE

## 2021-05-12 NOTE — PROGRESS NOTES
Chief Complaint   Patient presents with    Physical Exam     [employer] exam- 1st class ekg correctives no letter

## 2021-06-01 ENCOUNTER — OFFICE VISIT (OUTPATIENT)
Dept: FAMILY MEDICINE CLINIC | Facility: CLINIC | Age: 61
End: 2021-06-01
Payer: COMMERCIAL

## 2021-06-01 VITALS
HEIGHT: 74 IN | DIASTOLIC BLOOD PRESSURE: 98 MMHG | SYSTOLIC BLOOD PRESSURE: 140 MMHG | WEIGHT: 217 LBS | BODY MASS INDEX: 27.85 KG/M2

## 2021-06-01 DIAGNOSIS — Z12.11 COLON CANCER SCREENING: ICD-10-CM

## 2021-06-01 DIAGNOSIS — Z12.5 SCREENING FOR PROSTATE CANCER: ICD-10-CM

## 2021-06-01 DIAGNOSIS — Z00.00 ANNUAL PHYSICAL EXAM: Primary | ICD-10-CM

## 2021-06-01 DIAGNOSIS — D22.9 NUMEROUS MOLES: ICD-10-CM

## 2021-06-01 DIAGNOSIS — Z00.00 WELL ADULT EXAM: ICD-10-CM

## 2021-06-01 PROCEDURE — 1036F TOBACCO NON-USER: CPT | Performed by: FAMILY MEDICINE

## 2021-06-01 PROCEDURE — 99386 PREV VISIT NEW AGE 40-64: CPT | Performed by: FAMILY MEDICINE

## 2021-06-01 PROCEDURE — 3725F SCREEN DEPRESSION PERFORMED: CPT | Performed by: FAMILY MEDICINE

## 2021-06-01 PROCEDURE — 3008F BODY MASS INDEX DOCD: CPT | Performed by: FAMILY MEDICINE

## 2021-06-01 NOTE — PROGRESS NOTES
[facility]    NAME: Brandi Beltran  AGE: 64 y o  SEX: male  : 1960     DATE: 2021     Assessment and Plan:     Problem List Items Addressed This Visit     None      Visit Diagnoses     Annual physical exam    -  Primary    Relevant Orders    PSA, Total Screen    Lipid panel    CBC and Platelet    Comprehensive metabolic panel    TSH, 3rd generation    Vitamin D 25 hydroxy    Colon cancer screening        Relevant Orders    Ambulatory referral for colonoscopy    Well adult exam        Screening for prostate cancer        Relevant Orders    PSA, Total Screen    BMI 27 0-27 9,adult              Immunizations and preventive care screenings were discussed with patient today  Appropriate education was printed on patient's after visit summary  Counseling:  Alcohol/drug use: discussed moderation in alcohol intake, the recommendations for healthy alcohol use, and avoidance of illicit drug use  Dental Health: discussed importance of regular tooth brushing, flossing, and dental visits  Injury prevention: discussed safety/seat belts, safety helmets, smoke detectors, carbon dioxide detectors, and smoking near bedding or upholstery  Sexual health: discussed sexually transmitted diseases, partner selection, use of condoms, avoidance of unintended pregnancy, and contraceptive alternatives  · Exercise: the importance of regular exercise/physical activity was discussed  Recommend exercise 3-5 times per week for at least 30 minutes  BMI Counseling: Body mass index is 27 86 kg/m²   The BMI is above normal  Nutrition recommendations include decreasing portion sizes, encouraging healthy choices of fruits and vegetables, decreasing fast food intake, consuming healthier snacks, limiting drinks that contain sugar, moderation in carbohydrate intake, increasing intake of lean protein, reducing intake of saturated and trans fat and reducing intake of cholesterol  Exercise recommendations include moderate physical activity 150 minutes/week  No pharmacotherapy was ordered  Return in 1 year (on 6/1/2022)  Chief Complaint:     Chief Complaint   Patient presents with   [facility]     new patient / physical      History of Present Illness:     Adult Annual Physical   Patient here for a comprehensive physical exam  The patient reports no problems  Diet and Physical Activity  · Diet/Nutrition: well balanced diet  · Exercise: moderate cardiovascular exercise  Depression Screening  PHQ-9 Depression Screening    PHQ-9:   Frequency of the following problems over the past two weeks:      Little interest or pleasure in doing things: 0 - not at all  Feeling down, depressed, or hopeless: 0 - not at all  PHQ-2 Score: 0       General Health  · Sleep: sleeps poorly  · Hearing: normal - bilateral   · Vision: no vision problems  · Dental: regular dental visits   Health  · Symptoms include: none     Review of Systems:     Review of Systems   Constitutional: Negative  HENT: Negative  Eyes: Negative  Respiratory: Negative  Cardiovascular: Negative  Gastrointestinal: Negative  Endocrine: Negative  Genitourinary: Negative  Musculoskeletal: Negative  Skin: Negative  Allergic/Immunologic: Negative  Neurological: Negative  Hematological: Negative  Psychiatric/Behavioral: Negative  All other systems reviewed and are negative       Past Medical History:     Past Medical History:   Diagnosis Date    Chronic kidney disease       Past Surgical History:     Past Surgical History:   Procedure Laterality Date    APPENDECTOMY        Family History:     Family History   Problem Relation Age of Onset    Thyroid disease Mother     COPD Mother     Arthritis Mother     Heart disease Father     Coronary artery disease Father     Coronary artery disease Sister     Thyroid disease Sister     No Known Problems Daughter     Cancer Maternal Grandmother     Cancer Maternal Grandfather     No Known Problems Paternal Grandmother     No Known Problems Paternal Grandfather       Social History:     E-Cigarette/Vaping    E-Cigarette Use Never User      E-Cigarette/Vaping Substances    Nicotine No     THC No     CBD No     Flavoring No     Other No     Unknown No      Social History     Socioeconomic History    Marital status:      Spouse name: None    Number of children: 1    Years of education: None    Highest education level: None   Occupational History    Occupation:    Social Needs    Financial resource strain: Not hard at all   Mulino-Albert insecurity     Worry: Never true     Inability: Never true    Transportation needs     Medical: No     Non-medical: No   Tobacco Use    Smoking status: Never Smoker    Smokeless tobacco: Never Used   Substance and Sexual Activity    Alcohol use: Yes     Comment: Being A social drinker     Drug use: Never    Sexual activity: None   Lifestyle    Physical activity     Days per week: None     Minutes per session: None    Stress: None   Relationships    Social connections     Talks on phone: None     Gets together: None     Attends Holiness service: None     Active member of club or organization: None     Attends meetings of clubs or organizations: None     Relationship status: None    Intimate partner violence     Fear of current or ex partner: None     Emotionally abused: None     Physically abused: None     Forced sexual activity: None   Other Topics Concern    None   Social History Narrative    None      Current Medications:     No current outpatient medications on file  No current facility-administered medications for this visit         Allergies:     No Known Allergies   Physical Exam:     /98 (BP Location: Right arm, Patient Position: Sitting, Cuff Size: Large)   Ht 6' 2" (1 88 m)   Wt 98 4 kg (217 lb)   BMI 27 86 kg/m² Physical Exam  Vitals signs and nursing note reviewed  Constitutional:       Appearance: He is well-developed  HENT:      Head: Normocephalic  Nose: Nose normal    Eyes:      Conjunctiva/sclera: Conjunctivae normal       Pupils: Pupils are equal, round, and reactive to light  Neck:      Musculoskeletal: Normal range of motion and neck supple  Cardiovascular:      Rate and Rhythm: Normal rate  Pulmonary:      Effort: Pulmonary effort is normal       Breath sounds: Normal breath sounds  Abdominal:      General: Bowel sounds are normal       Palpations: Abdomen is soft  Musculoskeletal: Normal range of motion  Skin:     General: Skin is warm and dry  Neurological:      Mental Status: He is alert  Psychiatric:         Behavior: Behavior normal          Thought Content:  Thought content normal          Judgment: Judgment normal           Braulio Deleon DO  [id number] [facility] [place]

## 2021-06-01 NOTE — PATIENT INSTRUCTIONS
Wellness Visit for Adults   AMBULATORY CARE:   A wellness visit  is when you see your healthcare provider to get screened for health problems  Your healthcare provider will also give you advice on how to stay healthy  Write down your questions so you remember to ask them  Ask your healthcare provider how often you should have a wellness visit  What happens at a wellness visit:  Your healthcare provider will ask about your health, and your family history of health problems  This includes high blood pressure, heart disease, and cancer  He or she will ask if you have symptoms that concern you, if you smoke, and about your mood  You may also be asked about your intake of medicines, supplements, food, and alcohol  Any of the following may be done:  · Your weight  will be checked  Your height may also be checked so your body mass index (BMI) can be calculated  Your BMI shows if you are at a healthy weight  · Your blood pressure  and heart rate will be checked  Your temperature may also be checked  · Blood and urine tests  may be done  Blood tests may be done to check your cholesterol levels  Abnormal cholesterol levels increase your risk for heart disease and stroke  You may also need a blood or urine test to check for diabetes if you are at increased risk  Urine tests may be done to look for signs of an infection or kidney disease  · A physical exam  includes checking your heartbeat and lungs with a stethoscope  Your healthcare provider may also check your skin to look for sun damage  · Screening tests  may be recommended  A screening test is done to check for diseases that may not cause symptoms  The screening tests you may need depend on your age, gender, family history, and lifestyle habits  For example, colorectal screening may be recommended if you are 48years old or older  Screening tests you need if you are a woman:   · A Pap smear  is used to screen for cervical cancer   Pap smears are usually done every 3 to 5 years depending on your age  You may need them more often if you have had abnormal Pap smear test results in the past  Ask your healthcare provider how often you should have a Pap smear  · A mammogram  is an x-ray of your breasts to screen for breast cancer  Experts recommend mammograms every 2 years starting at age 48 years  You may need a mammogram at age 52 years or younger if you have an increased risk for breast cancer  Talk to your healthcare provider about when you should start having mammograms and how often you need them  Vaccines you may need:   · Get an influenza vaccine  every year  The influenza vaccine protects you from the flu  Several types of viruses cause the flu  The viruses change over time, so new vaccines are made each year  · Get a tetanus-diphtheria (Td) booster vaccine  every 10 years  This vaccine protects you against tetanus and diphtheria  Tetanus is a severe infection that may cause painful muscle spasms and lockjaw  Diphtheria is a severe bacterial infection that causes a thick covering in the back of your mouth and throat  · Get a human papillomavirus (HPV) vaccine  if you are female and aged 23 to 32 or male 23 to 24 and never received it  This vaccine protects you from HPV infection  HPV is the most common infection spread by sexual contact  HPV may also cause vaginal, penile, and anal cancers  · Get a pneumococcal vaccine  if you are aged 72 years or older  The pneumococcal vaccine is an injection given to protect you from pneumococcal disease  Pneumococcal disease is an infection caused by pneumococcal bacteria  The infection may cause pneumonia, meningitis, or an ear infection  · Get a shingles vaccine  if you are 60 or older, even if you have had shingles before  The shingles vaccine is an injection to protect you from the varicella-zoster virus  This is the same virus that causes chickenpox   Shingles is a painful rash that develops in people who had chickenpox or have been exposed to the virus  How to eat healthy:  My Plate is a model for planning healthy meals  It shows the types and amounts of foods that should go on your plate  Fruits and vegetables make up about half of your plate, and grains and protein make up the other half  A serving of dairy is included on the side of your plate  The amount of calories and serving sizes you need depends on your age, gender, weight, and height  Examples of healthy foods are listed below:  · Eat a variety of vegetables  such as dark green, red, and orange vegetables  You can also include canned vegetables low in sodium (salt) and frozen vegetables without added butter or sauces  · Eat a variety of fresh fruits , canned fruit in 100% juice, frozen fruit, and dried fruit  · Include whole grains  At least half of the grains you eat should be whole grains  Examples include whole-wheat bread, wheat pasta, brown rice, and whole-grain cereals such as oatmeal     · Eat a variety of protein foods such as seafood (fish and shellfish), lean meat, and poultry without skin (turkey and chicken)  Examples of lean meats include pork leg, shoulder, or tenderloin, and beef round, sirloin, tenderloin, and extra lean ground beef  Other protein foods include eggs and egg substitutes, beans, peas, soy products, nuts, and seeds  · Choose low-fat dairy products such as skim or 1% milk or low-fat yogurt, cheese, and cottage cheese  · Limit unhealthy fats  such as butter, hard margarine, and shortening  Exercise:  Exercise at least 30 minutes per day on most days of the week  Some examples of exercise include walking, biking, dancing, and swimming  You can also fit in more physical activity by taking the stairs instead of the elevator or parking farther away from stores  Include muscle strengthening activities 2 days each week  Regular exercise provides many health benefits   It helps you manage your weight, and decreases your risk for type 2 diabetes, heart disease, stroke, and high blood pressure  Exercise can also help improve your mood  Ask your healthcare provider about the best exercise plan for you  General health and safety guidelines:   · Do not smoke  Nicotine and other chemicals in cigarettes and cigars can cause lung damage  Ask your healthcare provider for information if you currently smoke and need help to quit  E-cigarettes or smokeless tobacco still contain nicotine  Talk to your healthcare provider before you use these products  · Limit alcohol  A drink of alcohol is 12 ounces of beer, 5 ounces of wine, or 1½ ounces of liquor  · Lose weight, if needed  Being overweight increases your risk of certain health conditions  These include heart disease, high blood pressure, type 2 diabetes, and certain types of cancer  · Protect your skin  Do not sunbathe or use tanning beds  Use sunscreen with a SPF 15 or higher  Apply sunscreen at least 15 minutes before you go outside  Reapply sunscreen every 2 hours  Wear protective clothing, hats, and sunglasses when you are outside  · Drive safely  Always wear your seatbelt  Make sure everyone in your car wears a seatbelt  A seatbelt can save your life if you are in an accident  Do not use your cell phone when you are driving  This could distract you and cause an accident  Pull over if you need to make a call or send a text message  · Practice safe sex  Use latex condoms if are sexually active and have more than one partner  Your healthcare provider may recommend screening tests for sexually transmitted infections (STIs)  · Wear helmets, lifejackets, and protective gear  Always wear a helmet when you ride a bike or motorcycle, go skiing, or play sports that could cause a head injury  Wear protective equipment when you play sports  Wear a lifejacket when you are on a boat or doing water sports      © Copyright [employer] 2020 Information is for End User's use only and may not be sold, redistributed or otherwise used for commercial purposes  All illustrations and images included in CareNotes® are the copyrighted property of [employer]  or Wilma Jaquez  The above information is an  only  It is not intended as medical advice for individual conditions or treatments  Talk to your doctor, nurse or pharmacist before following any medical regimen to see if it is safe and effective for you  Weight Management   AMBULATORY CARE:   Why it is important to manage your weight:  Being overweight increases your risk of health conditions such as heart disease, high blood pressure, type 2 diabetes, and certain types of cancer  It can also increase your risk for osteoarthritis, sleep apnea, and other respiratory problems  Aim for a slow, steady weight loss  Even a small amount of weight loss can lower your risk of health problems  How to lose weight safely:  A safe and healthy way to lose weight is to eat fewer calories and get regular exercise  · You can lose up about 1 pound a week by decreasing the number of calories you eat by 500 calories each day  You can decrease calories by eating smaller portion sizes or by cutting out high-calorie foods  Read labels to find out how many calories are in the foods you eat  · You can also burn calories with exercise such as walking, swimming, or biking  You will be more likely to keep weight off if you make these changes part of your lifestyle  Exercise at least 30 minutes per day on most days of the week  You can also fit in more physical activity by taking the stairs instead of the elevator or parking farther away from stores  Ask your healthcare provider about the best exercise plan for you  Healthy meal plan for weight management:  A healthy meal plan includes a variety of foods, contains fewer calories, and helps you stay healthy   A healthy meal plan includes the following:     · Eat whole-grain foods more often  A healthy meal plan should contain fiber  Fiber is the part of grains, fruits, and vegetables that is not broken down by your body  Whole-grain foods are healthy and provide extra fiber in your diet  Some examples of whole-grain foods are whole-wheat breads and pastas, oatmeal, brown rice, and bulgur  · Eat a variety of vegetables every day  Include dark, leafy greens such as spinach, kale, taj greens, and mustard greens  Eat yellow and orange vegetables such as carrots, sweet potatoes, and winter squash  · Eat a variety of fruits every day  Choose fresh or canned fruit (canned in its own juice or light syrup) instead of juice  Fruit juice has very little or no fiber  · Eat low-fat dairy foods  Drink fat-free (skim) milk or 1% milk  Eat fat-free yogurt and low-fat cottage cheese  Try low-fat cheeses such as mozzarella and other reduced-fat cheeses  · Choose meat and other protein foods that are low in fat  Choose beans or other legumes such as split peas or lentils  Choose fish, skinless poultry (chicken or turkey), or lean cuts of red meat (beef or pork)  Before you cook meat or poultry, cut off any visible fat  · Use less fat and oil  Try baking foods instead of frying them  Add less fat, such as margarine, sour cream, regular salad dressing and mayonnaise to foods  Eat fewer high-fat foods  Some examples of high-fat foods include french fries, doughnuts, ice cream, and cakes  · Eat fewer sweets  Limit foods and drinks that are high in sugar  This includes candy, cookies, regular soda, and sweetened drinks  Ways to decrease calories:   · Eat smaller portions  ? Use a small plate with smaller servings  ? Do not eat second helpings  ? When you eat at a restaurant, ask for a box and place half of your meal in the box before you eat  ? Share an entrée with someone else  · Replace high-calorie snacks with healthy, low-calorie snacks  ? Choose fresh fruit, vegetables, fat-free rice cakes, or air-popped popcorn instead of potato chips, nuts, or chocolate  ? Choose water or calorie-free drinks instead of soda or sweetened drinks  · Do not shop for groceries when you are hungry  You may be more likely to make unhealthy food choices  Take a grocery list of healthy foods and shop after you have eaten  · Eat regular meals  Do not skip meals  Skipping meals can lead to overeating later in the day  This can make it harder for you to lose weight  Eat a healthy snack in place of a meal if you do not have time to eat a regular meal  Talk with a dietitian to help you create a meal plan and schedule that is right for you  Other things to consider as you try to lose weight:   · Be aware of situations that may give you the urge to overeat, such as eating while watching television  Find ways to avoid these situations  For example, read a book, go for a walk, or do crafts  · Meet with a weight loss support group or friends who are also trying to lose weight  This may help you stay motivated to continue working on your weight loss goals  © Copyright [address] Information is for End User's use only and may not be sold, redistributed or otherwise used for commercial purposes  All illustrations and images included in CareNotes® are the copyrighted property of [employer] D A M , Inc  or [facility] Mounika Huang   The above information is an  only  It is not intended as medical advice for individual conditions or treatments  Talk to your doctor, nurse or pharmacist before following any medical regimen to see if it is safe and effective for you  Cholesterol and Your Health   AMBULATORY CARE:   Cholesterol  is a waxy, fat-like substance  Your body uses cholesterol to make hormones and new cells, and to protect nerves  Cholesterol is made by your body  It also comes from certain foods you eat, such as meat and dairy products   Your healthcare provider can help you set goals for your cholesterol levels  He or she can help you create a plan to meet your goals  Cholesterol level goals: Your cholesterol level goals depend on your risk for heart disease, your age, and your other health conditions  The following are general guidelines:  · Total cholesterol  includes low-density lipoprotein (LDL), high-density lipoprotein (HDL), and triglyceride levels  The total cholesterol level should be lower than 200 mg/dL and is best at about 150 mg/dL  · LDL cholesterol  is called bad cholesterol  because it forms plaque in your arteries  As plaque builds up, your arteries become narrow, and less blood flows through  When plaque decreases blood flow to your heart, you may have chest pain  If plaque completely blocks an artery that brings blood to your heart, you may have a heart attack  Plaque can break off and form blood clots  Blood clots may block arteries in your brain and cause a stroke  The level should be less than 130 mg/dL and is best at about 100 mg/dL  · HDL cholesterol  is called good cholesterol  because it helps remove LDL cholesterol from your arteries  It does this by attaching to LDL cholesterol and carrying it to your liver  Your liver breaks down LDL cholesterol so your body can get rid of it  High levels of HDL cholesterol can help prevent a heart attack and stroke  Low levels of HDL cholesterol can increase your risk for heart disease, heart attack, and stroke  The level should be 60 mg/dL or higher  · Triglycerides  are a type of fat that store energy from foods you eat  High levels of triglycerides also cause plaque buildup  This can increase your risk for a heart attack or stroke  If your triglyceride level is high, your LDL cholesterol level may also be high  The level should be less than 150 mg/dL      What increases your risk for high cholesterol:   · Smoking cigarettes    · Being overweight or obese, or not getting enough exercise    · Drinking large amounts of alcohol    · A medical condition such as hypertension (high blood pressure) or diabetes    · Certain genes passed from your parents to you    · Age older than 65 years    What you need to know about having your cholesterol levels checked: Adults 21to 39years of age should have their cholesterol levels checked every 4 to 6 years  Adults 45 years or older should have their cholesterol checked every 1 to 2 years  You may need your cholesterol checked more often, or at a younger age, if you have risk factors for heart disease  You may also need to have your cholesterol checked more often if you have other health conditions, such as diabetes  Blood tests are used to check cholesterol levels  Blood tests measure your levels of triglycerides, LDL cholesterol, and HDL cholesterol  How healthy fats affect your cholesterol levels:  Healthy fats, also called unsaturated fats, help lower LDL cholesterol and triglyceride levels  Healthy fats include the following:  · Monounsaturated fats  are found in foods such as olive oil, canola oil, avocado, nuts, and olives  · Polyunsaturated fats,  such as omega 3 fats, are found in fish, such as salmon, trout, and tuna  They can also be found in plant foods such as flaxseed, walnuts, and soybeans  How unhealthy fats affect your cholesterol levels:  Unhealthy fats increase LDL cholesterol and triglyceride levels  They are found in foods high in cholesterol, saturated fat, and trans fat:  · Cholesterol  is found in eggs, dairy, and meat  · Saturated fat  is found in butter, cheese, ice cream, whole milk, and coconut oil  Saturated fat is also found in meat, such as sausage, hot dogs, and bologna  · Trans fat  is found in liquid oils and is used in fried and baked foods  Foods that contain trans fats include chips, crackers, muffins, sweet rolls, microwave popcorn, and cookies      Treatment  for high cholesterol will also decrease your risk of heart disease, heart attack, and stroke  Treatment may include any of the following:  · Lifestyle changes  may include food, exercise, weight loss, and quitting smoking  You may also need to decrease the amount of alcohol you drink  Your healthcare provider will want you to start with lifestyle changes  Other treatment may be added if lifestyle changes are not enough  · Medicines  may be given to lower your LDL cholesterol, triglyceride levels, or total cholesterol level  You may need medicines to lower your cholesterol if any of the following is true:     ? You have a history of stroke, TIA, unstable angina, or a heart attack  ? Your LDL cholesterol level is 190 mg/dL or higher  ? You are age 36 to 76 years, have diabetes or heart disease risk factors, and your LDL cholesterol is 70 mg/dL or higher  · Supplements  include fish oil, red yeast rice, and garlic  Fish oil may help lower your triglyceride and LDL cholesterol levels  It may also increase your HDL cholesterol level  Red yeast rice may help decrease your total cholesterol level and LDL cholesterol level  Garlic may help lower your total cholesterol level  Do not take these supplements without talking to your healthcare provider  Food changes you can make to lower your cholesterol levels:  A dietitian can help you create a healthy eating plan  He or she can show you how to read food labels and choose foods low in saturated fat, trans fats, and cholesterol  · Decrease the total amount of fat you eat  Choose lean meats, fat-free or 1% fat milk, and low-fat dairy products, such as yogurt and cheese  Try to limit or avoid red meats  Limit or do not eat fried foods or baked goods, such as cookies  · Replace unhealthy fats with healthy fats  Cook foods in olive oil or canola oil  Choose soft margarines that are low in saturated fat and trans fat  Seeds, nuts, and avocados are other examples of healthy fats      · Eat foods with omega-3 fats  Examples include salmon, tuna, mackerel, walnuts, and flaxseed  Eat fish 2 times per week  Pregnant women should not eat fish that have high levels of mercury, such as shark, swordfish, and lew mackerel  · Increase the amount of high-fiber foods you eat  High-fiber foods can help lower your LDL cholesterol  Aim to get between 20 and 30 grams of fiber each day  Fruits and vegetables are high in fiber  Eat at least 5 servings each day  Other high-fiber foods are whole-grain or whole-wheat breads, pastas, or cereals, and brown rice  Eat 3 ounces of whole-grain foods each day  Increase fiber slowly  You may have abdominal discomfort, bloating, and gas if you add fiber to your diet too quickly  · Eat healthy protein foods  Examples include low-fat dairy products, skinless chicken and turkey, fish, and nuts  · Limit foods and drinks that are high in sugar  Your dietitian or healthcare provider can help you create daily limits for high-sugar foods and drinks  The limit may be lower if you have diabetes or another health condition  Limits can also help you lose weight if needed  Lifestyle changes you can make to lower your cholesterol levels:   · Maintain a healthy weight  Ask your healthcare provider what a healthy weight is for you  Ask him or her to help you create a weight loss plan if needed  Weight loss can decrease your total cholesterol and triglyceride levels  Weight loss may also help keep your blood pressure at a healthy level  · Exercise regularly  Exercise can help lower your total cholesterol level and maintain a healthy weight  Exercise can also help increase your HDL cholesterol level  Work with your healthcare provider to create an exercise program that is right for you  Get at least 30 to 40 minutes of moderate exercise most days of the week  Examples of exercise include brisk walking, swimming, or biking  · Do not smoke    Nicotine and other chemicals in cigarettes and cigars can raise your cholesterol levels  Ask your healthcare provider for information if you currently smoke and need help to quit  E-cigarettes or smokeless tobacco still contain nicotine  Talk to your healthcare provider before you use these products  · Limit or do not drink alcohol  Alcohol can increase your triglyceride levels  Ask your healthcare provider before you drink alcohol  Ask how much is okay for you to drink in 1 day or 1 week  © Copyright [address] Information is for End User's use only and may not be sold, redistributed or otherwise used for commercial purposes  All illustrations and images included in CareNotes® are the copyrighted property of [employer]  or [address] [place] [address]  The above information is an  only  It is not intended as medical advice for individual conditions or treatments  Talk to your doctor, nurse or pharmacist before following any medical regimen to see if it is safe and effective for you

## 2021-07-14 ENCOUNTER — LAB (OUTPATIENT)
Dept: LAB | Age: 61
End: 2021-07-14
Payer: COMMERCIAL

## 2021-07-14 DIAGNOSIS — Z12.5 SCREENING FOR PROSTATE CANCER: ICD-10-CM

## 2021-07-14 DIAGNOSIS — Z00.00 ANNUAL PHYSICAL EXAM: ICD-10-CM

## 2021-07-14 LAB
25(OH)D3 SERPL-MCNC: 17.3 NG/ML (ref 30–100)
ALBUMIN SERPL BCP-MCNC: 4.1 G/DL (ref 3.5–5)
ALP SERPL-CCNC: 61 U/L (ref 46–116)
ALT SERPL W P-5'-P-CCNC: 22 U/L (ref 12–78)
ANION GAP SERPL CALCULATED.3IONS-SCNC: 2 MMOL/L (ref 4–13)
AST SERPL W P-5'-P-CCNC: 16 U/L (ref 5–45)
BILIRUB SERPL-MCNC: 0.39 MG/DL (ref 0.2–1)
BUN SERPL-MCNC: 20 MG/DL (ref 5–25)
CALCIUM SERPL-MCNC: 9 MG/DL (ref 8.3–10.1)
CHLORIDE SERPL-SCNC: 111 MMOL/L (ref 100–108)
CHOLEST SERPL-MCNC: 212 MG/DL (ref 50–200)
CO2 SERPL-SCNC: 28 MMOL/L (ref 21–32)
CREAT SERPL-MCNC: 1 MG/DL (ref 0.6–1.3)
ERYTHROCYTE [DISTWIDTH] IN BLOOD BY AUTOMATED COUNT: 12.7 % (ref 11.6–15.1)
GFR SERPL CREATININE-BSD FRML MDRD: 81 ML/MIN/1.73SQ M
GLUCOSE P FAST SERPL-MCNC: 96 MG/DL (ref 65–99)
HCT VFR BLD AUTO: 45.5 % (ref 36.5–49.3)
HDLC SERPL-MCNC: 63 MG/DL
HGB BLD-MCNC: 15 G/DL (ref 12–17)
LDLC SERPL CALC-MCNC: 133 MG/DL (ref 0–100)
MCH RBC QN AUTO: 32.3 PG (ref 26.8–34.3)
MCHC RBC AUTO-ENTMCNC: 33 G/DL (ref 31.4–37.4)
MCV RBC AUTO: 98 FL (ref 82–98)
NONHDLC SERPL-MCNC: 149 MG/DL
PLATELET # BLD AUTO: 154 THOUSANDS/UL (ref 149–390)
PMV BLD AUTO: 12 FL (ref 8.9–12.7)
POTASSIUM SERPL-SCNC: 5.1 MMOL/L (ref 3.5–5.3)
PROT SERPL-MCNC: 7 G/DL (ref 6.4–8.2)
PSA SERPL-MCNC: 2.2 NG/ML (ref 0–4)
RBC # BLD AUTO: 4.64 MILLION/UL (ref 3.88–5.62)
SODIUM SERPL-SCNC: 141 MMOL/L (ref 136–145)
TRIGL SERPL-MCNC: 78 MG/DL
TSH SERPL DL<=0.05 MIU/L-ACNC: 1.7 UIU/ML (ref 0.36–3.74)
WBC # BLD AUTO: 5.93 THOUSAND/UL (ref 4.31–10.16)

## 2021-07-14 PROCEDURE — 82306 VITAMIN D 25 HYDROXY: CPT

## 2021-07-14 PROCEDURE — 80053 COMPREHEN METABOLIC PANEL: CPT

## 2021-07-14 PROCEDURE — 84443 ASSAY THYROID STIM HORMONE: CPT

## 2021-07-14 PROCEDURE — G0103 PSA SCREENING: HCPCS

## 2021-07-14 PROCEDURE — 85027 COMPLETE CBC AUTOMATED: CPT

## 2021-07-14 PROCEDURE — 80061 LIPID PANEL: CPT

## 2021-07-14 PROCEDURE — 36415 COLL VENOUS BLD VENIPUNCTURE: CPT

## 2021-07-15 NOTE — RESULT ENCOUNTER NOTE
I left a message on the patient's voicemail about his lab results  I recommended supplementing vitamin-D 3 600 to a 1000 International Units daily and rechecking in 6 months  I asked him to call me if he had any questions

## 2021-07-22 ENCOUNTER — TELEPHONE (OUTPATIENT)
Dept: GASTROENTEROLOGY | Facility: CLINIC | Age: 61
End: 2021-07-22

## 2021-07-22 NOTE — TELEPHONE ENCOUNTER
Is this the pt you were calling me at the office regarding his ins? If [employer] is his insurance, not sure why he couldn't go to other facilities but he should be able to come here

## 2021-07-23 ENCOUNTER — TELEPHONE (OUTPATIENT)
Dept: GASTROENTEROLOGY | Facility: AMBULATORY SURGERY CENTER | Age: 61
End: 2021-07-23

## 2021-07-23 NOTE — TELEPHONE ENCOUNTER
Andrew Crews  I called [facility] and yes this pt can come to our facility and is covered for screening    I came in for few hours this am to get some work done because I have to help cover  next week, if you are wondering what I'm doing here  So you can call to schedule him  Thanks cg

## 2021-07-23 NOTE — TELEPHONE ENCOUNTER
Called pt and lmom for him to call back to schedule his colon screening  I will try calling again in 1 week  If he calls before then please get him scheduled  Thank you

## 2021-08-02 NOTE — TELEPHONE ENCOUNTER
Lmom for pt to call us back to schedule colon  I will try calling again in 2 weeks  If he calls back please get him scheduled  Thank you

## 2021-08-04 NOTE — TELEPHONE ENCOUNTER
Patient had left message asking for a call to schedule his colonoscopy  I returned his call and had to leave message for him to call back

## 2021-08-12 ENCOUNTER — NURSE TRIAGE (OUTPATIENT)
Dept: OTHER | Facility: OTHER | Age: 61
End: 2021-08-12

## 2021-08-13 ENCOUNTER — ANESTHESIA EVENT (OUTPATIENT)
Dept: GASTROENTEROLOGY | Facility: AMBULATORY SURGERY CENTER | Age: 61
End: 2021-08-13

## 2021-08-13 ENCOUNTER — ANESTHESIA (OUTPATIENT)
Dept: GASTROENTEROLOGY | Facility: AMBULATORY SURGERY CENTER | Age: 61
End: 2021-08-13

## 2021-08-13 ENCOUNTER — HOSPITAL ENCOUNTER (OUTPATIENT)
Dept: GASTROENTEROLOGY | Facility: AMBULATORY SURGERY CENTER | Age: 61
Discharge: HOME | End: 2021-08-13
Payer: COMMERCIAL

## 2021-08-13 VITALS
BODY MASS INDEX: 26.11 KG/M2 | HEIGHT: 75 IN | RESPIRATION RATE: 18 BRPM | HEART RATE: 52 BPM | SYSTOLIC BLOOD PRESSURE: 125 MMHG | TEMPERATURE: 96.6 F | WEIGHT: 210 LBS | OXYGEN SATURATION: 96 % | DIASTOLIC BLOOD PRESSURE: 83 MMHG

## 2021-08-13 DIAGNOSIS — Z12.11 SPECIAL SCREENING FOR MALIGNANT NEOPLASMS, COLON: ICD-10-CM

## 2021-08-13 PROCEDURE — 45385 COLONOSCOPY W/LESION REMOVAL: CPT | Performed by: INTERNAL MEDICINE

## 2021-08-13 PROCEDURE — 45380 COLONOSCOPY AND BIOPSY: CPT | Performed by: INTERNAL MEDICINE

## 2021-08-13 PROCEDURE — 00811 ANES LWR INTST NDSC NOS: CPT | Performed by: NURSE ANESTHETIST, CERTIFIED REGISTERED

## 2021-08-13 RX ORDER — SODIUM CHLORIDE 9 MG/ML
20 INJECTION, SOLUTION INTRAVENOUS CONTINUOUS
Status: DISCONTINUED | OUTPATIENT
Start: 2021-08-13 | End: 2021-08-17 | Stop reason: HOSPADM

## 2021-08-13 RX ORDER — SODIUM CHLORIDE 9 MG/ML
30 INJECTION, SOLUTION INTRAVENOUS CONTINUOUS
Status: DISCONTINUED | OUTPATIENT
Start: 2021-08-13 | End: 2021-08-17 | Stop reason: HOSPADM

## 2021-08-13 RX ORDER — PROPOFOL 10 MG/ML
INJECTION, EMULSION INTRAVENOUS AS NEEDED
Status: DISCONTINUED | OUTPATIENT
Start: 2021-08-13 | End: 2021-08-13

## 2021-08-13 RX ADMIN — PROPOFOL 50 MG: 10 INJECTION, EMULSION INTRAVENOUS at 09:11

## 2021-08-13 RX ADMIN — PROPOFOL 50 MG: 10 INJECTION, EMULSION INTRAVENOUS at 09:01

## 2021-08-13 RX ADMIN — SODIUM CHLORIDE: 9 INJECTION, SOLUTION INTRAVENOUS at 08:49

## 2021-08-13 RX ADMIN — PROPOFOL 50 MG: 10 INJECTION, EMULSION INTRAVENOUS at 08:53

## 2021-08-13 RX ADMIN — PROPOFOL 200 MG: 10 INJECTION, EMULSION INTRAVENOUS at 08:50

## 2021-08-13 RX ADMIN — PROPOFOL 50 MG: 10 INJECTION, EMULSION INTRAVENOUS at 09:05

## 2021-08-13 RX ADMIN — PROPOFOL 50 MG: 10 INJECTION, EMULSION INTRAVENOUS at 08:56

## 2021-08-13 NOTE — ANESTHESIA POSTPROCEDURE EVALUATION
Post-Op Assessment Note    CV Status:  Stable  Pain Score: 0    Pain management: adequate     Mental Status:  Alert   Hydration Status:  Stable   PONV Controlled:  None   Airway Patency:  Patent      Post Op Vitals Reviewed: Yes      Staff: CRNA         No complications documented      BP      Temp     Pulse     Resp      SpO2

## 2021-08-13 NOTE — ANESTHESIA PREPROCEDURE EVALUATION
Procedure:  COLONOSCOPY    Relevant Problems   PULMONARY   (+) Pneumonia of right lung due to infectious organism        Physical Exam    Airway    Mallampati score: II  TM Distance: >3 FB  Neck ROM: full     Dental   No notable dental hx     Cardiovascular  Cardiovascular exam normal    Pulmonary  Pulmonary exam normal     Other Findings        Anesthesia Plan  ASA Score- 2     Anesthesia Type- IV sedation with anesthesia with ASA Monitors  Additional Monitors:   Airway Plan:           Plan Factors-Exercise tolerance (METS): >4 METS  Chart reviewed  Existing labs reviewed  Patient is not a current smoker  Patient instructed to abstain from smoking on day of procedure  Patient did not smoke on day of surgery  Induction- intravenous  Postoperative Plan-     Informed Consent- Anesthetic plan and risks discussed with patient  I personally reviewed this patient with the CRNA  Discussed and agreed on the Anesthesia Plan with the CRNA  Kathia Colin

## 2021-08-13 NOTE — DISCHARGE INSTRUCTIONS
Colonoscopy   WHAT YOU NEED TO KNOW:   A colonoscopy is a procedure to examine the inside of your colon (intestine) with a scope  Polyps or tissue growths may have been removed during your colonoscopy  It is normal to feel bloated and to have some abdominal discomfort  You should be passing gas  If you have hemorrhoids or you had polyps removed, you may have a small amount of bleeding  DISCHARGE INSTRUCTIONS:   Seek care immediately if:    You have sudden, severe abdominal pain   You have problems swallowing   You have a large amount of black, sticky bowel movements or blood in your bowel movements   You have sudden trouble breathing   You feel weak, lightheaded, or faint or your heart beats faster than normal for you  Contact your healthcare provider if:    You have a fever and chills   You have nausea or are vomiting   Your abdomen is bloated or feels full and hard   You have abdominal pain   You have black, sticky bowel movements or blood in your bowel movements   You have not had a bowel movement for 3 days after your procedure   You have rash or hives   You have questions or concerns about your procedure  Activity:    Do not lift, strain, or run for 24 hours after your procedure   Rest after your procedure  You have been given medicine to relax you  Do not drive or make important decisions until the day after your procedure  Return to your normal activity as directed   Relieve gas and discomfort from bloating by lying on your right side with a heating pad on your abdomen  You may need to take short walks to help the gas move out  Eat small meals until bloating is relieved  Follow up with your healthcare provider as directed: Write down your questions so you remember to ask them during your visits  If you take a blood thinner, please review the specific instructions from your endoscopist about when you should resume it   These can be found in the Recommendation and Your Medication list sections of this After Visit Summary  High Fiber Diet   WHAT YOU NEED TO KNOW:   What is a high-fiber diet? A high-fiber diet includes foods that have a high amount of fiber  Fiber is the part of fruits, vegetables, and grains that is not broken down by your body  Fiber keeps your bowel movements regular  Fiber can also help lower your cholesterol level, control blood sugar in people with diabetes, and relieve constipation  Fiber can also help you control your weight because it helps you feel full faster  Most adults should eat 25 to 35 grams of fiber each day  Talk to your dietitian or healthcare provider about the amount of fiber you need  What foods are good sources of fiber? · Foods with at least 4 grams of fiber per serving:      ? ? to ½ cup of high-fiber cereal (check the nutrition label on the box)    ? ½ cup of blackberries or raspberries    ? 4 dried prunes    ? 1 cooked artichoke    ? ½ cup of cooked legumes, such as lentils, or red, kidney, and vaughan beans    · Foods with 1 to 3 grams of fiber per serving:      ? 1 slice of whole-wheat, pumpernickel, or rye bread    ? ½ cup of cooked brown rice    ? 4 whole-wheat crackers    ? 1 cup of oatmeal    ? ½ cup of cereal with 1 to 3 grams of fiber per serving (check the nutrition label on the box)    ? 1 small piece of fruit, such as an apple, banana, pear, kiwi, or orange    ? 3 dates    ? ½ cup of canned apricots, fruit cocktail, peaches, or pears    ? ½ cup of raw or cooked vegetables, such as carrots, cauliflower, cabbage, spinach, squash, or corn  What are some ways that I can increase fiber in my diet? · Choose brown or wild rice instead of white rice  · Use whole wheat flour in recipes instead of white or all-purpose flour  · Add beans and peas to casseroles or soups  · Choose fresh fruit and vegetables with peels or skins on instead of juices      What other guidelines should I follow? · Add fiber to your diet slowly  You may have abdominal discomfort, bloating, and gas if you add fiber to your diet too quickly  · Drink plenty of liquids as you add fiber to your diet  You may have nausea or develop constipation if you do not drink enough water  Ask how much liquid to drink each day and which liquids are best for you  CARE AGREEMENT:   You have the right to help plan your care  Discuss treatment options with your healthcare provider to decide what care you want to receive  You always have the right to refuse treatment  The above information is an  only  It is not intended as medical advice for individual conditions or treatments  Talk to your doctor, nurse or pharmacist before following any medical regimen to see if it is safe and effective for you  © Copyright [employer] 2021 Information is for End User's use only and may not be sold, redistributed or otherwise used for commercial purposes  All illustrations and images included in CareNotes® are the copyrighted property of A D A M , [employer]  or [address] [place] [address]  Colorectal Polyps   WHAT YOU NEED TO KNOW:   Colorectal polyps are small growths of tissue in the lining of the colon and rectum  Most polyps are hyperplastic polyps and are usually benign (noncancerous)  Certain types of polyps, called adenomatous polyps, may turn into cancer  DISCHARGE INSTRUCTIONS:   Follow up with your healthcare provider or gastroenterologist as directed: You may need to return for more tests, such as another colonoscopy  Write down your questions so you remember to ask them during your visits  Reduce your risk for colorectal polyps:   · Eat a variety of healthy foods:  Healthy foods include fruit, vegetables, whole-grain breads, low-fat dairy products, beans, lean meat, and fish  Ask if you need to be on a special diet      · Maintain a healthy weight:  Ask your healthcare provider if you need to lose weight and how much you need to lose  Ask for help with a weight loss program     · Exercise:  Begin to exercise slowly and do more as you get stronger  Talk with your healthcare provider before you start an exercise program      · Limit alcohol:  Your risk for polyps increases the more you drink  · Do not smoke: If you smoke, it is never too late to quit  Ask for information about how to stop  For support and more information:   · Etiennerakesh Avilez ([facility])  [address] [place], [place] [address]  Phone: 9- 569 [phone]  Web Address: www digestive  niddk nih gov    Contact your healthcare provider or gastroenterologist if:   · You have a fever  · You have chills, a cough, or feel weak and achy  · You have abdominal pain that does not go away or gets worse after you take medicine  · Your abdomen is swollen  · You are losing weight without trying  · You have questions or concerns about your condition or care  Seek care immediately or call 911 if:   · You have sudden shortness of breath  · You have a fast heart rate, fast breathing, or are too dizzy to stand up  · You have severe abdominal pain  · You see blood in your bowel movement  © Copyright [address] Information is for End User's use only and may not be sold, redistributed or otherwise used for commercial purposes  All illustrations and images included in CareNotes® are the copyrighted property of [employer] M [employer]  or Wilma Jaquez  The above information is an  only  It is not intended as medical advice for individual conditions or treatments  Talk to your doctor, nurse or pharmacist before following any medical regimen to see if it is safe and effective for you

## 2021-08-13 NOTE — TELEPHONE ENCOUNTER
I TC DR Job Davey about this patient's concerns that his prep will not clean him out for his 0830 Coloscopy tomorrow  he states that he needs this to happen tomorrow due to his schedule and he started the prep of Miralax and Dulcolax at 1600 and just now had one stool  He wanted the Dr to know that he does normally have a slower digestive system  His question is should he take something else besides the rest of the drink at 0200? Dr Cynthia Camp responded back to tell him his bowel movements will come  eventually as look as he was on clear liquids today and took the Dulcolax also  I called this patient back and  made him aware and he felt better about everything and will continue on the prep plan

## 2021-08-13 NOTE — TELEPHONE ENCOUNTER
Reason for Disposition   [1] Caller has URGENT medicine question about med that PCP or specialist prescribed AND [2] triager unable to answer question    Answer Assessment - Initial Assessment Questions  1  NAME of MEDICATION: "What medicine are you calling about?"     Pre for Coloscopy -Miralax and Ducolax  2  QUESTION: "What is your question?" (e g , medication refill, side effect)      It does not seem to be wokring? 3  PRESCRIBING HCP: "Who prescribed it?" Reason: if prescribed by specialist, call should be referred to that group  Gi   4  SYMPTOMS: "Do you have any symptoms?"      He had one BM prior to the call  5  SEVERITY: If symptoms are present, ask "Are they mild, moderate or severe?"      Some bloating  6   PREGNANCY:  "Is there any chance that you are pregnant?" "When was your last menstrual period?"     NA    Protocols used: MEDICATION QUESTION CALL-ADULT-

## 2021-08-13 NOTE — TELEPHONE ENCOUNTER
Regarding: No Bowel Movement after consuming prep  ----- Message from Gloriann Angelucci sent at 8/12/2021  8:11 PM EDT -----  "I started taking my prep at 4p and I have yet to have a bowel movement, should I be concerned?  My procedure is scheduled for 8:30a"

## 2021-08-13 NOTE — H&P
History and Physical -  [facility]  Shelly Woodard 64 y o  male MRN: [id number]                  HPI: Shelly Woodard is a 64y o  year old male who presents for screening colonoscopy      REVIEW OF SYSTEMS: Per the HPI, and otherwise unremarkable  Historical Information   Past Medical History:   Diagnosis Date    Kidney stone      Past Surgical History:   Procedure Laterality Date    APPENDECTOMY       Social History   Social History     Substance and Sexual Activity   Alcohol Use Yes    Comment: Being A social drinker      Social History     Substance and Sexual Activity   Drug Use Never     Social History     Tobacco Use   Smoking Status Never Smoker   Smokeless Tobacco Never Used     Family History   Problem Relation Age of Onset    Thyroid disease Mother     COPD Mother     Arthritis Mother     Heart disease Father     Coronary artery disease Father     Coronary artery disease Sister     Thyroid disease Sister     No Known Problems Daughter     Cancer Maternal Grandmother     Cancer Maternal Grandfather     No Known Problems Paternal Grandmother     No Known Problems Paternal Grandfather        Meds/Allergies       Current Outpatient Medications:     B Complex-Biotin-FA (VITAMIN B50 COMPLEX PO)    VITAMIN D, CHOLECALCIFEROL, PO    VITAMIN E PO    Current Facility-Administered Medications:     sodium chloride 0 9 % infusion, 30 mL/hr, Intravenous, Continuous    No Known Allergies    Objective     /94   Pulse 56   Temp (!) 96 6 °F (35 9 °C) (Temporal)   Resp 18   Ht 6' 3" (1 905 m)   Wt 95 3 kg (210 lb)   SpO2 96%   BMI 26 25 kg/m²       PHYSICAL EXAM    Gen: NAD  Head: NCAT  CV: RRR  CHEST: Clear  ABD: soft, NT/ND  EXT: no edema      ASSESSMENT/PLAN:  This is a 64y o  year old male here for screening colonoscopy, and he is stable and optimized for his procedure

## 2021-08-16 NOTE — TELEPHONE ENCOUNTER
LMOM for pt to call back to schedule colon screening  I will try calling again in 2 weeks  If he calls back please get him scheduled  Thank you

## 2021-11-30 ENCOUNTER — OFFICE VISIT (OUTPATIENT)
Dept: FAMILY MEDICINE CLINIC | Facility: CLINIC | Age: 61
End: 2021-11-30
Payer: COMMERCIAL

## 2021-11-30 VITALS
WEIGHT: 217 LBS | HEART RATE: 52 BPM | HEIGHT: 74 IN | BODY MASS INDEX: 27.85 KG/M2 | SYSTOLIC BLOOD PRESSURE: 130 MMHG | DIASTOLIC BLOOD PRESSURE: 90 MMHG

## 2021-11-30 DIAGNOSIS — Z02.89 ENCOUNTER FOR FEDERAL AVIATION ADMINISTRATION (FAA) EXAMINATION: Primary | ICD-10-CM

## 2021-11-30 PROCEDURE — 99499 UNLISTED E&M SERVICE: CPT | Performed by: FAMILY MEDICINE

## 2022-01-10 ENCOUNTER — IMMUNIZATIONS (OUTPATIENT)
Dept: FAMILY MEDICINE CLINIC | Facility: HOSPITAL | Age: 62
End: 2022-01-10

## 2022-01-10 DIAGNOSIS — Z23 ENCOUNTER FOR IMMUNIZATION: Primary | ICD-10-CM

## 2022-01-10 PROCEDURE — 91300 COVID-19 PFIZER VACC 0.3 ML: CPT

## 2022-01-10 PROCEDURE — 0001A COVID-19 PFIZER VACC 0.3 ML: CPT

## 2022-05-19 ENCOUNTER — OFFICE VISIT (OUTPATIENT)
Dept: FAMILY MEDICINE CLINIC | Facility: CLINIC | Age: 62
End: 2022-05-19

## 2022-05-19 VITALS
BODY MASS INDEX: 27.85 KG/M2 | HEART RATE: 65 BPM | SYSTOLIC BLOOD PRESSURE: 120 MMHG | WEIGHT: 217 LBS | HEIGHT: 74 IN | DIASTOLIC BLOOD PRESSURE: 80 MMHG

## 2022-05-19 DIAGNOSIS — Z02.89 ENCOUNTER FOR FEDERAL AVIATION ADMINISTRATION (FAA) EXAMINATION: Primary | ICD-10-CM

## 2022-05-19 PROCEDURE — 99499 UNLISTED E&M SERVICE: CPT | Performed by: FAMILY MEDICINE

## 2023-07-31 ENCOUNTER — OFFICE VISIT (OUTPATIENT)
Dept: FAMILY MEDICINE CLINIC | Facility: CLINIC | Age: 63
End: 2023-07-31

## 2023-07-31 VITALS
SYSTOLIC BLOOD PRESSURE: 132 MMHG | BODY MASS INDEX: 27.59 KG/M2 | HEIGHT: 74 IN | WEIGHT: 215 LBS | HEART RATE: 58 BPM | DIASTOLIC BLOOD PRESSURE: 84 MMHG

## 2023-07-31 DIAGNOSIS — Z12.5 PROSTATE CANCER SCREENING: ICD-10-CM

## 2023-07-31 DIAGNOSIS — Z02.89 ENCOUNTER FOR FEDERAL AVIATION ADMINISTRATION (FAA) EXAMINATION: Primary | ICD-10-CM

## 2023-07-31 DIAGNOSIS — Z11.4 SCREENING FOR HIV (HUMAN IMMUNODEFICIENCY VIRUS): ICD-10-CM

## 2023-07-31 DIAGNOSIS — E55.9 VITAMIN D DEFICIENCY: ICD-10-CM

## 2023-07-31 DIAGNOSIS — Z00.00 WELL ADULT EXAM: ICD-10-CM

## 2023-07-31 DIAGNOSIS — Z11.59 NEED FOR HEPATITIS C SCREENING TEST: ICD-10-CM

## 2023-07-31 PROCEDURE — 99499 UNLISTED E&M SERVICE: CPT | Performed by: FAMILY MEDICINE

## 2023-07-31 PROCEDURE — 93000 ELECTROCARDIOGRAM COMPLETE: CPT | Performed by: FAMILY MEDICINE

## 2023-08-11 ENCOUNTER — APPOINTMENT (OUTPATIENT)
Dept: LAB | Facility: CLINIC | Age: 63
End: 2023-08-11
Payer: COMMERCIAL

## 2023-08-11 DIAGNOSIS — Z11.59 NEED FOR HEPATITIS C SCREENING TEST: ICD-10-CM

## 2023-08-11 DIAGNOSIS — Z12.5 PROSTATE CANCER SCREENING: ICD-10-CM

## 2023-08-11 DIAGNOSIS — Z00.00 WELL ADULT EXAM: ICD-10-CM

## 2023-08-11 DIAGNOSIS — Z11.4 SCREENING FOR HIV (HUMAN IMMUNODEFICIENCY VIRUS): ICD-10-CM

## 2023-08-11 LAB
25(OH)D3 SERPL-MCNC: 24.5 NG/ML (ref 30–100)
ALBUMIN SERPL BCP-MCNC: 4.3 G/DL (ref 3.5–5)
ALP SERPL-CCNC: 43 U/L (ref 34–104)
ALT SERPL W P-5'-P-CCNC: 16 U/L (ref 7–52)
ANION GAP SERPL CALCULATED.3IONS-SCNC: 5 MMOL/L
AST SERPL W P-5'-P-CCNC: 18 U/L (ref 13–39)
BILIRUB SERPL-MCNC: 0.54 MG/DL (ref 0.2–1)
BUN SERPL-MCNC: 18 MG/DL (ref 5–25)
CALCIUM SERPL-MCNC: 8.7 MG/DL (ref 8.4–10.2)
CHLORIDE SERPL-SCNC: 110 MMOL/L (ref 96–108)
CHOLEST SERPL-MCNC: 217 MG/DL
CO2 SERPL-SCNC: 25 MMOL/L (ref 21–32)
CREAT SERPL-MCNC: 0.98 MG/DL (ref 0.6–1.3)
GFR SERPL CREATININE-BSD FRML MDRD: 81 ML/MIN/1.73SQ M
GLUCOSE P FAST SERPL-MCNC: 101 MG/DL (ref 65–99)
HCV AB SER QL: NORMAL
HDLC SERPL-MCNC: 60 MG/DL
LDLC SERPL CALC-MCNC: 142 MG/DL (ref 0–100)
NONHDLC SERPL-MCNC: 157 MG/DL
POTASSIUM SERPL-SCNC: 4.6 MMOL/L (ref 3.5–5.3)
PROT SERPL-MCNC: 6.5 G/DL (ref 6.4–8.4)
PSA SERPL-MCNC: 2.07 NG/ML (ref 0–4)
SODIUM SERPL-SCNC: 140 MMOL/L (ref 135–147)
TRIGL SERPL-MCNC: 73 MG/DL

## 2023-08-11 PROCEDURE — 83036 HEMOGLOBIN GLYCOSYLATED A1C: CPT | Performed by: FAMILY MEDICINE

## 2023-08-11 PROCEDURE — G0103 PSA SCREENING: HCPCS

## 2023-08-11 PROCEDURE — 36415 COLL VENOUS BLD VENIPUNCTURE: CPT

## 2023-08-11 PROCEDURE — 80061 LIPID PANEL: CPT | Performed by: FAMILY MEDICINE

## 2023-08-11 PROCEDURE — 86803 HEPATITIS C AB TEST: CPT

## 2023-08-11 PROCEDURE — 80053 COMPREHEN METABOLIC PANEL: CPT | Performed by: FAMILY MEDICINE

## 2023-08-11 PROCEDURE — 82306 VITAMIN D 25 HYDROXY: CPT | Performed by: FAMILY MEDICINE

## 2023-08-11 PROCEDURE — 87389 HIV-1 AG W/HIV-1&-2 AB AG IA: CPT

## 2023-08-12 LAB
EST. AVERAGE GLUCOSE BLD GHB EST-MCNC: 111 MG/DL
HBA1C MFR BLD: 5.5 %

## 2023-08-14 LAB
HIV 1+2 AB+HIV1 P24 AG SERPL QL IA: NORMAL
HIV 2 AB SERPL QL IA: NORMAL
HIV1 AB SERPL QL IA: NORMAL
HIV1 P24 AG SERPL QL IA: NORMAL

## 2023-08-31 ENCOUNTER — OFFICE VISIT (OUTPATIENT)
Dept: FAMILY MEDICINE CLINIC | Facility: CLINIC | Age: 63
End: 2023-08-31
Payer: COMMERCIAL

## 2023-08-31 VITALS
BODY MASS INDEX: 27.08 KG/M2 | DIASTOLIC BLOOD PRESSURE: 88 MMHG | HEIGHT: 74 IN | SYSTOLIC BLOOD PRESSURE: 140 MMHG | WEIGHT: 211 LBS | TEMPERATURE: 98 F | OXYGEN SATURATION: 94 % | HEART RATE: 60 BPM

## 2023-08-31 DIAGNOSIS — Z00.00 ANNUAL PHYSICAL EXAM: Primary | ICD-10-CM

## 2023-08-31 PROCEDURE — 99396 PREV VISIT EST AGE 40-64: CPT | Performed by: FAMILY MEDICINE

## 2023-08-31 NOTE — PATIENT INSTRUCTIONS
Wellness Visit for Adults   AMBULATORY CARE:   A wellness visit  is when you see your healthcare provider to get screened for health problems. Your healthcare provider will also give you advice on how to stay healthy. Write down your questions so you remember to ask them. Ask your healthcare provider how often you should have a wellness visit. What happens at a wellness visit:  Your healthcare provider will ask about your health, and your family history of health problems. This includes high blood pressure, heart disease, and cancer. He or she will ask if you have symptoms that concern you, if you smoke, and about your mood. You may also be asked about your intake of medicines, supplements, food, and alcohol. Any of the following may be done:  • Your weight  will be checked. Your height may also be checked so your body mass index (BMI) can be calculated. Your BMI shows if you are at a healthy weight. • Your blood pressure  and heart rate will be checked. Your temperature may also be checked. • Blood and urine tests  may be done. Blood tests may be done to check your cholesterol levels. Abnormal cholesterol levels increase your risk for heart disease and stroke. You may also need a blood or urine test to check for diabetes if you are at increased risk. Urine tests may be done to look for signs of an infection or kidney disease. • A physical exam  includes checking your heartbeat and lungs with a stethoscope. Your healthcare provider may also check your skin to look for sun damage. • Screening tests  may be recommended. A screening test is done to check for diseases that may not cause symptoms. The screening tests you may need depend on your age, gender, family history, and lifestyle habits. For example, colorectal screening may be recommended if you are 48years old or older. Screening tests you need if you are a woman:   • A Pap smear  is used to screen for cervical cancer.  Pap smears are usually done every 3 to 5 years depending on your age. You may need them more often if you have had abnormal Pap smear test results in the past. Ask your healthcare provider how often you should have a Pap smear. • A mammogram  is an x-ray of your breasts to screen for breast cancer. Experts recommend mammograms every 2 years starting at age 48 years. You may need a mammogram at age 52 years or younger if you have an increased risk for breast cancer. Talk to your healthcare provider about when you should start having mammograms and how often you need them. Vaccines you may need:   • Get an influenza vaccine  every year. The influenza vaccine protects you from the flu. Several types of viruses cause the flu. The viruses change over time, so new vaccines are made each year. • Get a tetanus-diphtheria (Td) booster vaccine  every 10 years. This vaccine protects you against tetanus and diphtheria. Tetanus is a severe infection that may cause painful muscle spasms and lockjaw. Diphtheria is a severe bacterial infection that causes a thick covering in the back of your mouth and throat. • Get a human papillomavirus (HPV) vaccine  if you are female and aged 23 to 32 or male 23 to 24 and never received it. This vaccine protects you from HPV infection. HPV is the most common infection spread by sexual contact. HPV may also cause vaginal, penile, and anal cancers. • Get a pneumococcal vaccine  if you are aged 72 years or older. The pneumococcal vaccine is an injection given to protect you from pneumococcal disease. Pneumococcal disease is an infection caused by pneumococcal bacteria. The infection may cause pneumonia, meningitis, or an ear infection. • Get a shingles vaccine  if you are 60 or older, even if you have had shingles before. The shingles vaccine is an injection to protect you from the varicella-zoster virus. This is the same virus that causes chickenpox.  Shingles is a painful rash that develops in people who had chickenpox or have been exposed to the virus. How to eat healthy:  My Plate is a model for planning healthy meals. It shows the types and amounts of foods that should go on your plate. Fruits and vegetables make up about half of your plate, and grains and protein make up the other half. A serving of dairy is included on the side of your plate. The amount of calories and serving sizes you need depends on your age, gender, weight, and height. Examples of healthy foods are listed below:  • Eat a variety of vegetables  such as dark green, red, and orange vegetables. You can also include canned vegetables low in sodium (salt) and frozen vegetables without added butter or sauces. • Eat a variety of fresh fruits , canned fruit in 100% juice, frozen fruit, and dried fruit. • Include whole grains. At least half of the grains you eat should be whole grains. Examples include whole-wheat bread, wheat pasta, brown rice, and whole-grain cereals such as oatmeal.    • Eat a variety of protein foods such as seafood (fish and shellfish), lean meat, and poultry without skin (turkey and chicken). Examples of lean meats include pork leg, shoulder, or tenderloin, and beef round, sirloin, tenderloin, and extra lean ground beef. Other protein foods include eggs and egg substitutes, beans, peas, soy products, nuts, and seeds. • Choose low-fat dairy products such as skim or 1% milk or low-fat yogurt, cheese, and cottage cheese. • Limit unhealthy fats  such as butter, hard margarine, and shortening. Exercise:  Exercise at least 30 minutes per day on most days of the week. Some examples of exercise include walking, biking, dancing, and swimming. You can also fit in more physical activity by taking the stairs instead of the elevator or parking farther away from stores. Include muscle strengthening activities 2 days each week. Regular exercise provides many health benefits.  It helps you manage your weight, and decreases your risk for type 2 diabetes, heart disease, stroke, and high blood pressure. Exercise can also help improve your mood. Ask your healthcare provider about the best exercise plan for you. General health and safety guidelines:   • Do not smoke. Nicotine and other chemicals in cigarettes and cigars can cause lung damage. Ask your healthcare provider for information if you currently smoke and need help to quit. E-cigarettes or smokeless tobacco still contain nicotine. Talk to your healthcare provider before you use these products. • Limit alcohol. A drink of alcohol is 12 ounces of beer, 5 ounces of wine, or 1½ ounces of liquor. • Lose weight, if needed. Being overweight increases your risk of certain health conditions. These include heart disease, high blood pressure, type 2 diabetes, and certain types of cancer. • Protect your skin. Do not sunbathe or use tanning beds. Use sunscreen with a SPF 15 or higher. Apply sunscreen at least 15 minutes before you go outside. Reapply sunscreen every 2 hours. Wear protective clothing, hats, and sunglasses when you are outside. • Drive safely. Always wear your seatbelt. Make sure everyone in your car wears a seatbelt. A seatbelt can save your life if you are in an accident. Do not use your cell phone when you are driving. This could distract you and cause an accident. Pull over if you need to make a call or send a text message. • Practice safe sex. Use latex condoms if are sexually active and have more than one partner. Your healthcare provider may recommend screening tests for sexually transmitted infections (STIs). • Wear helmets, lifejackets, and protective gear. Always wear a helmet when you ride a bike or motorcycle, go skiing, or play sports that could cause a head injury. Wear protective equipment when you play sports. Wear a lifejacket when you are on a boat or doing water sports.     © Copyright [employer] 2022 Information is for End User's use only and may not be sold, redistributed or otherwise used for commercial purposes. The above information is an  only. It is not intended as medical advice for individual conditions or treatments. Talk to your doctor, nurse or pharmacist before following any medical regimen to see if it is safe and effective for you. Weight Management   AMBULATORY CARE:   Why it is important to manage your weight:  Being overweight increases your risk of health conditions such as heart disease, high blood pressure, type 2 diabetes, and certain types of cancer. It can also increase your risk for osteoarthritis, sleep apnea, and other respiratory problems. Aim for a slow, steady weight loss. Even a small amount of weight loss can lower your risk of health problems. Risks of being overweight:  Extra weight can cause many health problems, including the following:  • Diabetes (high blood sugar level)    • High blood pressure or high cholesterol    • Heart disease    • Stroke    • Gallbladder or liver disease    • Cancer of the colon, breast, prostate, liver, or kidney    • Sleep apnea    • Arthritis or gout    Screening  is done to check for health conditions before you have signs or symptoms. If you are 28to 79years old, your blood sugar level may be checked every 3 years for signs of prediabetes or diabetes. Your healthcare provider will check your blood pressure at each visit. High blood pressure can lead to a stroke or other problems. Your provider may check for signs of heart disease, cancer, or other health problems. How to lose weight safely:  A safe and healthy way to lose weight is to eat fewer calories and get regular exercise. • You can lose up about 1 pound a week by decreasing the number of calories you eat by 500 calories each day. You can decrease calories by eating smaller portion sizes or by cutting out high-calorie foods.  Read labels to find out how many calories are in the foods you eat.         • You can also burn calories with exercise such as walking, swimming, or biking. You will be more likely to keep weight off if you make these changes part of your lifestyle. Exercise at least 30 minutes per day on most days of the week. You can also fit in more physical activity by taking the stairs instead of the elevator or parking farther away from stores. Ask your healthcare provider about the best exercise plan for you. Healthy meal plan for weight management:  A healthy meal plan includes a variety of foods, contains fewer calories, and helps you stay healthy. A healthy meal plan includes the following:     • Eat whole-grain foods more often. A healthy meal plan should contain fiber. Fiber is the part of grains, fruits, and vegetables that is not broken down by your body. Whole-grain foods are healthy and provide extra fiber in your diet. Some examples of whole-grain foods are whole-wheat breads and pastas, oatmeal, brown rice, and bulgur. • Eat a variety of vegetables every day. Include dark, leafy greens such as spinach, kale, taj greens, and mustard greens. Eat yellow and orange vegetables such as carrots, sweet potatoes, and winter squash. • Eat a variety of fruits every day. Choose fresh or canned fruit (canned in its own juice or light syrup) instead of juice. Fruit juice has very little or no fiber. • Eat low-fat dairy foods. Drink fat-free (skim) milk or 1% milk. Eat fat-free yogurt and low-fat cottage cheese. Try low-fat cheeses such as mozzarella and other reduced-fat cheeses. • Choose meat and other protein foods that are low in fat. Choose beans or other legumes such as split peas or lentils. Choose fish, skinless poultry (chicken or turkey), or lean cuts of red meat (beef or pork). Before you cook meat or poultry, cut off any visible fat. • Use less fat and oil. Try baking foods instead of frying them.  Add less fat, such as margarine, sour cream, regular salad dressing and mayonnaise to foods. Eat fewer high-fat foods. Some examples of high-fat foods include french fries, doughnuts, ice cream, and cakes. • Eat fewer sweets. Limit foods and drinks that are high in sugar. This includes candy, cookies, regular soda, and sweetened drinks. Ways to decrease calories:   • Eat smaller portions. ? Use a small plate with smaller servings. ? Do not eat second helpings. ? When you eat at a restaurant, ask for a box and place half of your meal in the box before you eat. ? Share an entrée with someone else. • Replace high-calorie snacks with healthy, low-calorie snacks. ? Choose fresh fruit, vegetables, fat-free rice cakes, or air-popped popcorn instead of potato chips, nuts, or chocolate. ? Choose water or calorie-free drinks instead of soda or sweetened drinks. • Do not shop for groceries when you are hungry. You may be more likely to make unhealthy food choices. Take a grocery list of healthy foods and shop after you have eaten. • Eat regular meals. Do not skip meals. Skipping meals can lead to overeating later in the day. This can make it harder for you to lose weight. Eat a healthy snack in place of a meal if you do not have time to eat a regular meal. Talk with a dietitian to help you create a meal plan and schedule that is right for you. Other things to consider as you try to lose weight:   • Be aware of situations that may give you the urge to overeat, such as eating while watching television. Find ways to avoid these situations. For example, read a book, go for a walk, or do crafts. • Meet with a weight loss support group or friends who are also trying to lose weight. This may help you stay motivated to continue working on your weight loss goals. © Copyright [employer] 2022 Information is for End User's use only and may not be sold, redistributed or otherwise used for commercial purposes.   The above information is an  only. It is not intended as medical advice for individual conditions or treatments. Talk to your doctor, nurse or pharmacist before following any medical regimen to see if it is safe and effective for you. Cholesterol and Your Health   AMBULATORY CARE:   Cholesterol  is a waxy, fat-like substance. Your body uses cholesterol to make hormones and new cells, and to protect nerves. Cholesterol is made by your body. It also comes from certain foods you eat, such as meat and dairy products. Your healthcare provider can help you set goals for your cholesterol levels. He or she can help you create a plan to meet your goals. Cholesterol level goals: Your cholesterol level goals depend on your risk for heart disease, your age, and your other health conditions. The following are general guidelines:  • Total cholesterol  includes low-density lipoprotein (LDL), high-density lipoprotein (HDL), and triglyceride levels. The total cholesterol level should be lower than 200 mg/dL and is best at about 150 mg/dL. • LDL cholesterol  is called bad cholesterol  because it forms plaque in your arteries. As plaque builds up, your arteries become narrow, and less blood flows through. When plaque decreases blood flow to your heart, you may have chest pain. If plaque completely blocks an artery that brings blood to your heart, you may have a heart attack. Plaque can break off and form blood clots. Blood clots may block arteries in your brain and cause a stroke. The level should be less than 130 mg/dL and is best at about 100 mg/dL. • HDL cholesterol  is called good cholesterol  because it helps remove LDL cholesterol from your arteries. It does this by attaching to LDL cholesterol and carrying it to your liver. Your liver breaks down LDL cholesterol so your body can get rid of it. High levels of HDL cholesterol can help prevent a heart attack and stroke.  Low levels of HDL cholesterol can increase your risk for heart disease, heart attack, and stroke. The level should be 60 mg/dL or higher. • Triglycerides  are a type of fat that store energy from foods you eat. High levels of triglycerides also cause plaque buildup. This can increase your risk for a heart attack or stroke. If your triglyceride level is high, your LDL cholesterol level may also be high. The level should be less than 150 mg/dL. Any of the following can increase your risk for high cholesterol:   • Smoking cigarettes    • Being overweight or obese, or not getting enough exercise    • Drinking large amounts of alcohol    • A medical condition such as hypertension (high blood pressure) or diabetes    • Certain genes passed from your parents to you    • Age older than 65 years    What you need to know about having your cholesterol levels checked: Adults 21to 39years of age should have their cholesterol levels checked every 4 to 6 years. Adults 45 years or older should have their cholesterol checked every 1 to 2 years. You may need your cholesterol checked more often, or at a younger age, if you have risk factors for heart disease. You may also need to have your cholesterol checked more often if you have other health conditions, such as diabetes. Blood tests are used to check cholesterol levels. Blood tests measure your levels of triglycerides, LDL cholesterol, and HDL cholesterol. How healthy fats affect your cholesterol levels:  Healthy fats, also called unsaturated fats, help lower LDL cholesterol and triglyceride levels. Healthy fats include the following:  • Monounsaturated fats  are found in foods such as olive oil, canola oil, avocado, nuts, and olives. • Polyunsaturated fats,  such as omega 3 fats, are found in fish, such as salmon, trout, and tuna. They can also be found in plant foods such as flaxseed, walnuts, and soybeans.     How unhealthy fats affect your cholesterol levels:  Unhealthy fats increase LDL cholesterol and triglyceride levels. They are found in foods high in cholesterol, saturated fat, and trans fat:  • Cholesterol  is found in eggs, dairy, and meat. • Saturated fat  is found in butter, cheese, ice cream, whole milk, and coconut oil. Saturated fat is also found in meat, such as sausage, hot dogs, and bologna. • Trans fat  is found in liquid oils and is used in fried and baked foods. Foods that contain trans fats include chips, crackers, muffins, sweet rolls, microwave popcorn, and cookies. Treatment  for high cholesterol will also decrease your risk of heart disease, heart attack, and stroke. Treatment may include any of the following:  • Lifestyle changes  may include food, exercise, weight loss, and quitting smoking. You may also need to decrease the amount of alcohol you drink. Your healthcare provider will want you to start with lifestyle changes. Other treatment may be added if lifestyle changes are not enough. Your healthcare provider may recommend you work with a team to manage hyperlipidemia. The team may include medical experts such as a dietitian, an exercise or physical therapist, and a behavior therapist. Your family members may be included in helping you create lifestyle changes. • Medicines  may be given to lower your LDL cholesterol, triglyceride levels, or total cholesterol level. You may need medicines to lower your cholesterol if any of the following is true:    ? You have a history of stroke, TIA, unstable angina, or a heart attack. ? Your LDL cholesterol level is 190 mg/dL or higher. ? You are age 36 to 76 years, have diabetes or heart disease risk factors, and your LDL cholesterol is 70 mg/dL or higher. • Supplements  include fish oil, red yeast rice, and garlic. Fish oil may help lower your triglyceride and LDL cholesterol levels. It may also increase your HDL cholesterol level. Red yeast rice may help decrease your total cholesterol level and LDL cholesterol level.  Garlic may help lower your total cholesterol level. Do not take any supplements without talking to your healthcare provider. Food changes you can make to lower your cholesterol levels:  A dietitian can help you create a healthy eating plan. He or she can show you how to read food labels and choose foods low in saturated fat, trans fats, and cholesterol. • Decrease the total amount of fat you eat. Choose lean meats, fat-free or 1% fat milk, and low-fat dairy products, such as yogurt and cheese. Try to limit or avoid red meats. Limit or do not eat fried foods or baked goods, such as cookies. • Replace unhealthy fats with healthy fats. Cook foods in olive oil or canola oil. Choose soft margarines that are low in saturated fat and trans fat. Seeds, nuts, and avocados are other examples of healthy fats. • Eat foods with omega-3 fats. Examples include salmon, tuna, mackerel, walnuts, and flaxseed. Eat fish 2 times per week. Pregnant women should not eat fish that have high levels of mercury, such as shark, swordfish, and lew mackerel. • Increase the amount of high-fiber foods you eat. High-fiber foods can help lower your LDL cholesterol. Aim to get between 20 and 30 grams of fiber each day. Fruits and vegetables are high in fiber. Eat at least 5 servings each day. Other high-fiber foods are whole-grain or whole-wheat breads, pastas, or cereals, and brown rice. Eat 3 ounces of whole-grain foods each day. Increase fiber slowly. You may have abdominal discomfort, bloating, and gas if you add fiber to your diet too quickly. • Eat healthy protein foods. Examples include low-fat dairy products, skinless chicken and turkey, fish, and nuts. • Limit foods and drinks that are high in sugar. Your dietitian or healthcare provider can help you create daily limits for high-sugar foods and drinks. The limit may be lower if you have diabetes or another health condition.  Limits can also help you lose weight if needed. Lifestyle changes you can make to lower your cholesterol levels:   • Maintain a healthy weight. Ask your healthcare provider what a healthy weight is for you. Ask him or her to help you create a weight loss plan if needed. Weight loss can decrease your total cholesterol and triglyceride levels. Weight loss may also help keep your blood pressure at a healthy level. • Be physically active throughout the day. Physical activity, such as exercise, can help lower your total cholesterol level and maintain a healthy weight. Physical activity can also help increase your HDL cholesterol level. Work with your healthcare provider to create an program that is right for you. Get at least 30 to 40 minutes of moderate physical activity most days of the week. Examples of exercise include brisk walking, swimming, or biking. Also include strength training at least 2 times each week. Your healthcare providers can help you create a physical activity plan. • Do not smoke. Nicotine and other chemicals in cigarettes and cigars can raise your cholesterol levels. Ask your healthcare provider for information if you currently smoke and need help to quit. E-cigarettes or smokeless tobacco still contain nicotine. Talk to your healthcare provider before you use these products. • Limit or do not drink alcohol. Alcohol can increase your triglyceride levels. Ask your healthcare provider before you drink alcohol. Ask how much is okay for you to drink in 24 hours or 1 week. Follow up with your doctor as directed:  Write down your questions so you remember to ask them during your visits. © Copyright [facility] [place] 2022 Information is for End User's use only and may not be sold, redistributed or otherwise used for commercial purposes. The above information is an  only. It is not intended as medical advice for individual conditions or treatments.  Talk to your doctor, nurse or pharmacist before following any medical regimen to see if it is safe and effective for you.

## 2024-01-24 ENCOUNTER — OFFICE VISIT (OUTPATIENT)
Dept: FAMILY MEDICINE CLINIC | Facility: CLINIC | Age: 64
End: 2024-01-24

## 2024-01-24 VITALS
HEIGHT: 74 IN | BODY MASS INDEX: 27.85 KG/M2 | SYSTOLIC BLOOD PRESSURE: 130 MMHG | DIASTOLIC BLOOD PRESSURE: 80 MMHG | HEART RATE: 60 BPM | WEIGHT: 217 LBS

## 2024-01-24 DIAGNOSIS — Z02.89 ENCOUNTER FOR FEDERAL AVIATION ADMINISTRATION (FAA) EXAMINATION: Primary | ICD-10-CM

## 2024-01-24 DIAGNOSIS — Z00.00 WELL ADULT EXAM: ICD-10-CM

## 2024-01-24 PROCEDURE — 99499 UNLISTED E&M SERVICE: CPT | Performed by: FAMILY MEDICINE

## 2024-01-24 NOTE — PROGRESS NOTES
Chief Complaint   Patient presents with   • Physical Exam     Faa 1st class no EKG wears correctives anayeli

## 2024-02-21 PROBLEM — J18.9 PNEUMONIA OF RIGHT LUNG DUE TO INFECTIOUS ORGANISM: Status: RESOLVED | Noted: 2020-01-24 | Resolved: 2024-02-21

## 2024-06-21 ENCOUNTER — TELEPHONE (OUTPATIENT)
Dept: GASTROENTEROLOGY | Facility: CLINIC | Age: 64
End: 2024-06-21

## 2024-06-21 NOTE — TELEPHONE ENCOUNTER
Pt is due for a colonoscopy with Dr Lamar for hx of multiple colonic polyps, hx of adenomatous polyps.  I lmom for pt to please call back to schedule. Will call again if do not hear back from him.

## 2024-07-01 ENCOUNTER — OFFICE VISIT (OUTPATIENT)
Dept: FAMILY MEDICINE CLINIC | Facility: CLINIC | Age: 64
End: 2024-07-01

## 2024-07-01 VITALS
BODY MASS INDEX: 27.23 KG/M2 | HEART RATE: 53 BPM | WEIGHT: 219 LBS | DIASTOLIC BLOOD PRESSURE: 88 MMHG | HEIGHT: 75 IN | SYSTOLIC BLOOD PRESSURE: 120 MMHG

## 2024-07-01 DIAGNOSIS — Z02.89 ENCOUNTER FOR FEDERAL AVIATION ADMINISTRATION (FAA) EXAMINATION: Primary | ICD-10-CM

## 2024-07-01 PROCEDURE — 99499 UNLISTED E&M SERVICE: CPT | Performed by: FAMILY MEDICINE

## 2024-07-01 PROCEDURE — 93000 ELECTROCARDIOGRAM COMPLETE: CPT | Performed by: FAMILY MEDICINE

## 2024-07-24 ENCOUNTER — TELEPHONE (OUTPATIENT)
Age: 64
End: 2024-07-24

## 2024-07-24 ENCOUNTER — PREP FOR PROCEDURE (OUTPATIENT)
Age: 64
End: 2024-07-24

## 2024-07-24 DIAGNOSIS — Z86.010 HISTORY OF COLON POLYPS: Primary | ICD-10-CM

## 2024-07-24 NOTE — TELEPHONE ENCOUNTER
Scheduled date of colonoscopy (as of today):08.20.24    Physician performing colonoscopy:Willard    Location of colonoscopy:[facility]     Bowel prep reviewed with patient:Bart/Hammad    Instructions reviewed with patient by:Lsims and sent to chart

## 2024-07-24 NOTE — TELEPHONE ENCOUNTER
07/24/24  Screened by: Kaushal Alejandra    Referring Provider    Pre- Screening:     There is no height or weight on file to calculate BMI.  Has patient been referred for a routine screening Colonoscopy? no  Is the patient between 45-75 years old? yes      Previous Colonoscopy yes   If yes:    Date: 2021     Facility: [facility]    Reason:       Does the patient want to see a Gastroenterologist prior to their procedure OR are they having any GI symptoms? no    Has the patient been hospitalized or had abdominal surgery in the past 6 months? no    Does the patient use supplemental oxygen? no    Does the patient take Coumadin, Lovenox, Plavix, Elliquis, Xarelto, or other blood thinning medication? no    Has the patient had a stroke, cardiac event, or stent placed in the past year? no      If patient is between 45yrs - 49yrs, please advise patient that we will have to confirm benefits & coverage with their insurance company for a routine screening colonoscopy.

## 2024-09-02 RX ORDER — SODIUM CHLORIDE, SODIUM LACTATE, POTASSIUM CHLORIDE, CALCIUM CHLORIDE 600; 310; 30; 20 MG/100ML; MG/100ML; MG/100ML; MG/100ML
75 INJECTION, SOLUTION INTRAVENOUS CONTINUOUS
Status: CANCELLED | OUTPATIENT
Start: 2024-09-02

## 2024-09-03 ENCOUNTER — ANESTHESIA (OUTPATIENT)
Dept: GASTROENTEROLOGY | Facility: HOSPITAL | Age: 64
End: 2024-09-03

## 2024-09-03 ENCOUNTER — HOSPITAL ENCOUNTER (OUTPATIENT)
Dept: GASTROENTEROLOGY | Facility: HOSPITAL | Age: 64
Setting detail: OUTPATIENT SURGERY
Discharge: HOME | End: 2024-09-03
Attending: INTERNAL MEDICINE
Payer: COMMERCIAL

## 2024-09-03 ENCOUNTER — ANESTHESIA EVENT (OUTPATIENT)
Dept: GASTROENTEROLOGY | Facility: HOSPITAL | Age: 64
End: 2024-09-03

## 2024-09-03 VITALS
HEART RATE: 46 BPM | OXYGEN SATURATION: 98 % | DIASTOLIC BLOOD PRESSURE: 76 MMHG | RESPIRATION RATE: 13 BRPM | HEIGHT: 75 IN | BODY MASS INDEX: 26.49 KG/M2 | SYSTOLIC BLOOD PRESSURE: 130 MMHG | TEMPERATURE: 97.6 F | WEIGHT: 213 LBS

## 2024-09-03 DIAGNOSIS — Z86.010 HISTORY OF COLON POLYPS: ICD-10-CM

## 2024-09-03 PROBLEM — N20.0 NEPHROLITHIASIS: Status: ACTIVE | Noted: 2024-09-03

## 2024-09-03 PROCEDURE — 88305 TISSUE EXAM BY PATHOLOGIST: CPT | Performed by: PATHOLOGY

## 2024-09-03 PROCEDURE — 45380 COLONOSCOPY AND BIOPSY: CPT | Performed by: INTERNAL MEDICINE

## 2024-09-03 PROCEDURE — 45385 COLONOSCOPY W/LESION REMOVAL: CPT | Performed by: INTERNAL MEDICINE

## 2024-09-03 RX ORDER — LIDOCAINE HYDROCHLORIDE 20 MG/ML
INJECTION, SOLUTION EPIDURAL; INFILTRATION; INTRACAUDAL; PERINEURAL AS NEEDED
Status: DISCONTINUED | OUTPATIENT
Start: 2024-09-03 | End: 2024-09-03

## 2024-09-03 RX ORDER — SODIUM CHLORIDE, SODIUM LACTATE, POTASSIUM CHLORIDE, CALCIUM CHLORIDE 600; 310; 30; 20 MG/100ML; MG/100ML; MG/100ML; MG/100ML
75 INJECTION, SOLUTION INTRAVENOUS CONTINUOUS
Status: DISCONTINUED | OUTPATIENT
Start: 2024-09-03 | End: 2024-09-07 | Stop reason: HOSPADM

## 2024-09-03 RX ORDER — SODIUM CHLORIDE, SODIUM LACTATE, POTASSIUM CHLORIDE, CALCIUM CHLORIDE 600; 310; 30; 20 MG/100ML; MG/100ML; MG/100ML; MG/100ML
INJECTION, SOLUTION INTRAVENOUS CONTINUOUS PRN
Status: DISCONTINUED | OUTPATIENT
Start: 2024-09-03 | End: 2024-09-03

## 2024-09-03 RX ORDER — PROPOFOL 10 MG/ML
INJECTION, EMULSION INTRAVENOUS AS NEEDED
Status: DISCONTINUED | OUTPATIENT
Start: 2024-09-03 | End: 2024-09-03

## 2024-09-03 RX ADMIN — SODIUM CHLORIDE, SODIUM LACTATE, POTASSIUM CHLORIDE, AND CALCIUM CHLORIDE: .6; .31; .03; .02 INJECTION, SOLUTION INTRAVENOUS at 14:35

## 2024-09-03 RX ADMIN — PROPOFOL 20 MG: 10 INJECTION, EMULSION INTRAVENOUS at 15:00

## 2024-09-03 RX ADMIN — PROPOFOL 50 MG: 10 INJECTION, EMULSION INTRAVENOUS at 14:49

## 2024-09-03 RX ADMIN — LIDOCAINE HYDROCHLORIDE 100 MG: 20 INJECTION, SOLUTION EPIDURAL; INFILTRATION; INTRACAUDAL; PERINEURAL at 14:48

## 2024-09-03 RX ADMIN — PROPOFOL 50 MG: 10 INJECTION, EMULSION INTRAVENOUS at 14:56

## 2024-09-03 RX ADMIN — PROPOFOL 100 MG: 10 INJECTION, EMULSION INTRAVENOUS at 14:48

## 2024-09-03 RX ADMIN — PROPOFOL 50 MG: 10 INJECTION, EMULSION INTRAVENOUS at 14:52

## 2024-09-03 NOTE — ANESTHESIA POSTPROCEDURE EVALUATION
Post-Op Assessment Note    CV Status:  Stable  Pain Score: 0    Pain management: adequate       Mental Status:  Arousable   Hydration Status:  Stable   PONV Controlled:  Controlled   Airway Patency:  Patent  Two or more mitigation strategies used for obstructive sleep apnea   Post Op Vitals Reviewed: Yes    No anethesia notable event occurred.    Staff: CRNA               BP   90/54   Temp      Pulse 51   Resp 14   SPO2 98

## 2024-09-03 NOTE — H&P
"History and Physical -  [facility]  Binh Mercado 64 y.o. male MRN: [id number]                  HPI: Binh Mercado is a 64 y.o. year old male who presents for history of colon polyps      REVIEW OF SYSTEMS: Per the HPI, and otherwise unremarkable.    Historical Information   Past Medical History:   Diagnosis Date    Kidney stone      Past Surgical History:   Procedure Laterality Date    APPENDECTOMY       Social History   Social History     Substance and Sexual Activity   Alcohol Use Yes    Comment: Being A social drinker      Social History     Substance and Sexual Activity   Drug Use Never     Social History     Tobacco Use   Smoking Status Never   Smokeless Tobacco Never     Family History   Problem Relation Age of Onset    Thyroid disease Mother     COPD Mother     Arthritis Mother     Heart disease Father     Coronary artery disease Father     Coronary artery disease Sister     Thyroid disease Sister     No Known Problems Daughter     Cancer Maternal Grandmother     Cancer Maternal Grandfather     No Known Problems Paternal Grandmother     No Known Problems Paternal Grandfather        Meds/Allergies       Current Outpatient Medications:     B Complex-Biotin-FA (VITAMIN B50 COMPLEX PO)    VITAMIN D, CHOLECALCIFEROL, PO    VITAMIN E PO    Current Facility-Administered Medications:     lactated ringers infusion, 75 mL/hr, Intravenous, Continuous    Facility-Administered Medications Ordered in Other Encounters:     lactated ringers infusion, , Intravenous, Continuous PRN, New Bag at 09/03/24 1435    No Known Allergies    Objective     /96   Pulse (!) 52   Temp 97.8 °F (36.6 °C) (Temporal)   Resp 12   Ht 6' 3\" (1.905 m)   Wt 96.6 kg (213 lb)   SpO2 99%   BMI 26.62 kg/m²       PHYSICAL EXAM    Gen: NAD  Head: NCAT  CV: RRR  CHEST: Clear  ABD: soft, NT/ND  EXT: no edema      ASSESSMENT/PLAN:  This is a 64 y.o. year old male here for colonoscopy, and he is stable and optimized for his " procedure.

## 2024-09-03 NOTE — ANESTHESIA PREPROCEDURE EVALUATION
Procedure:  COLONOSCOPY    Relevant Problems   /RENAL   (+) Nephrolithiasis      PULMONARY   (+) Pneumonia of right lung due to infectious organism (Resolved)      Orthopedic/Musculoskeletal   (+) Closed displaced fracture of shaft of right clavicle      Other   (+) Encounter for [employer] ([employer]) examination        Physical Exam    Airway    Mallampati score: II  TM Distance: >3 FB  Neck ROM: full     Dental   No notable dental hx     Cardiovascular      Pulmonary      Other Findings        Anesthesia Plan  ASA Score- 2     Anesthesia Type- IV sedation with anesthesia with ASA Monitors.         Additional Monitors:     Airway Plan:            Plan Factors-Exercise tolerance (METS): >4 METS.    Chart reviewed.    Patient summary reviewed.    Patient is not a current smoker.  Patient did not smoke on day of surgery.            Induction- intravenous.    Postoperative Plan-     Perioperative Resuscitation Plan - Level 1 - Full Code.       Informed Consent- Anesthetic plan and risks discussed with patient.  I personally reviewed this patient with the CRNA. Discussed and agreed on the Anesthesia Plan with the CRNA..

## 2024-09-06 PROCEDURE — 88305 TISSUE EXAM BY PATHOLOGIST: CPT | Performed by: PATHOLOGY

## 2025-01-30 ENCOUNTER — OFFICE VISIT (OUTPATIENT)
Age: 65
End: 2025-01-30

## 2025-01-30 VITALS
HEIGHT: 75 IN | HEART RATE: 53 BPM | OXYGEN SATURATION: 98 % | DIASTOLIC BLOOD PRESSURE: 88 MMHG | BODY MASS INDEX: 27.6 KG/M2 | WEIGHT: 222 LBS | SYSTOLIC BLOOD PRESSURE: 138 MMHG

## 2025-01-30 DIAGNOSIS — E55.9 VITAMIN D DEFICIENCY: ICD-10-CM

## 2025-01-30 DIAGNOSIS — Z12.5 SCREENING FOR PROSTATE CANCER: ICD-10-CM

## 2025-01-30 DIAGNOSIS — Z00.00 WELL ADULT EXAM: ICD-10-CM

## 2025-01-30 DIAGNOSIS — Z02.89 ENCOUNTER FOR FEDERAL AVIATION ADMINISTRATION (FAA) EXAMINATION: Primary | ICD-10-CM

## 2025-01-30 PROCEDURE — 99499 UNLISTED E&M SERVICE: CPT | Performed by: FAMILY MEDICINE

## 2025-01-30 RX ORDER — FLUOCINONIDE 0.5 MG/G
CREAM TOPICAL
COMMUNITY
Start: 2024-11-01

## 2025-01-30 NOTE — PROGRESS NOTES
Chief Complaint   Patient presents with   • FAA     1st, No EKG, +Correctives.   C#: [id number].

## 2025-03-10 ENCOUNTER — APPOINTMENT (OUTPATIENT)
Dept: LAB | Age: 65
End: 2025-03-10
Payer: COMMERCIAL

## 2025-03-10 ENCOUNTER — RESULTS FOLLOW-UP (OUTPATIENT)
Age: 65
End: 2025-03-10

## 2025-03-10 DIAGNOSIS — Z12.5 SCREENING FOR PROSTATE CANCER: ICD-10-CM

## 2025-03-10 LAB
25(OH)D3 SERPL-MCNC: 27.8 NG/ML (ref 30–100)
ALBUMIN SERPL BCG-MCNC: 4.3 G/DL (ref 3.5–5)
ALP SERPL-CCNC: 51 U/L (ref 34–104)
ALT SERPL W P-5'-P-CCNC: 25 U/L (ref 7–52)
ANION GAP SERPL CALCULATED.3IONS-SCNC: 9 MMOL/L (ref 4–13)
AST SERPL W P-5'-P-CCNC: 29 U/L (ref 13–39)
BASOPHILS # BLD AUTO: 0.06 THOUSANDS/ÂΜL (ref 0–0.1)
BASOPHILS NFR BLD AUTO: 1 % (ref 0–1)
BILIRUB SERPL-MCNC: 0.47 MG/DL (ref 0.2–1)
BUN SERPL-MCNC: 17 MG/DL (ref 5–25)
CALCIUM SERPL-MCNC: 8.9 MG/DL (ref 8.4–10.2)
CHLORIDE SERPL-SCNC: 107 MMOL/L (ref 96–108)
CHOLEST SERPL-MCNC: 200 MG/DL (ref ?–200)
CO2 SERPL-SCNC: 27 MMOL/L (ref 21–32)
CREAT SERPL-MCNC: 1 MG/DL (ref 0.6–1.3)
EOSINOPHIL # BLD AUTO: 0.41 THOUSAND/ÂΜL (ref 0–0.61)
EOSINOPHIL NFR BLD AUTO: 6 % (ref 0–6)
ERYTHROCYTE [DISTWIDTH] IN BLOOD BY AUTOMATED COUNT: 13.1 % (ref 11.6–15.1)
EST. AVERAGE GLUCOSE BLD GHB EST-MCNC: 105 MG/DL
GFR SERPL CREATININE-BSD FRML MDRD: 78 ML/MIN/1.73SQ M
GLUCOSE P FAST SERPL-MCNC: 88 MG/DL (ref 65–99)
HBA1C MFR BLD: 5.3 %
HCT VFR BLD AUTO: 42.9 % (ref 36.5–49.3)
HDLC SERPL-MCNC: 60 MG/DL
HGB BLD-MCNC: 14.2 G/DL (ref 12–17)
IMM GRANULOCYTES # BLD AUTO: 0.01 THOUSAND/UL (ref 0–0.2)
IMM GRANULOCYTES NFR BLD AUTO: 0 % (ref 0–2)
LDLC SERPL CALC-MCNC: 121 MG/DL (ref 0–100)
LYMPHOCYTES # BLD AUTO: 2.63 THOUSANDS/ÂΜL (ref 0.6–4.47)
LYMPHOCYTES NFR BLD AUTO: 39 % (ref 14–44)
MCH RBC QN AUTO: 32.1 PG (ref 26.8–34.3)
MCHC RBC AUTO-ENTMCNC: 33.1 G/DL (ref 31.4–37.4)
MCV RBC AUTO: 97 FL (ref 82–98)
MONOCYTES # BLD AUTO: 0.58 THOUSAND/ÂΜL (ref 0.17–1.22)
MONOCYTES NFR BLD AUTO: 9 % (ref 4–12)
NEUTROPHILS # BLD AUTO: 3.14 THOUSANDS/ÂΜL (ref 1.85–7.62)
NEUTS SEG NFR BLD AUTO: 45 % (ref 43–75)
NONHDLC SERPL-MCNC: 140 MG/DL
NRBC BLD AUTO-RTO: 0 /100 WBCS
PLATELET # BLD AUTO: 169 THOUSANDS/UL (ref 149–390)
PMV BLD AUTO: 12.5 FL (ref 8.9–12.7)
POTASSIUM SERPL-SCNC: 4.3 MMOL/L (ref 3.5–5.3)
PROT SERPL-MCNC: 6.5 G/DL (ref 6.4–8.4)
PSA SERPL-MCNC: 2.35 NG/ML (ref 0–4)
RBC # BLD AUTO: 4.42 MILLION/UL (ref 3.88–5.62)
SODIUM SERPL-SCNC: 143 MMOL/L (ref 135–147)
TRIGL SERPL-MCNC: 93 MG/DL (ref ?–150)
WBC # BLD AUTO: 6.83 THOUSAND/UL (ref 4.31–10.16)

## 2025-03-10 PROCEDURE — G0103 PSA SCREENING: HCPCS

## 2025-03-11 ENCOUNTER — OFFICE VISIT (OUTPATIENT)
Age: 65
End: 2025-03-11
Payer: COMMERCIAL

## 2025-03-11 VITALS
WEIGHT: 211.2 LBS | RESPIRATION RATE: 16 BRPM | BODY MASS INDEX: 26.26 KG/M2 | HEART RATE: 45 BPM | SYSTOLIC BLOOD PRESSURE: 138 MMHG | DIASTOLIC BLOOD PRESSURE: 80 MMHG | OXYGEN SATURATION: 97 % | TEMPERATURE: 97.3 F | HEIGHT: 75 IN

## 2025-03-11 DIAGNOSIS — Z00.00 ANNUAL PHYSICAL EXAM: Primary | ICD-10-CM

## 2025-03-11 PROBLEM — N20.0 NEPHROLITHIASIS: Status: RESOLVED | Noted: 2024-09-03 | Resolved: 2025-03-11

## 2025-03-11 PROCEDURE — 99396 PREV VISIT EST AGE 40-64: CPT | Performed by: FAMILY MEDICINE

## 2025-03-11 RX ORDER — NIACIN 1000 MG/1
1000 TABLET, EXTENDED RELEASE ORAL
COMMUNITY

## 2025-03-11 RX ORDER — MULTIVIT WITH MINERALS/LUTEIN
1000 TABLET ORAL DAILY
COMMUNITY

## 2025-03-11 RX ORDER — FOLIC ACID/MULTIVIT,IRON,MINER 0.4MG-18MG
TABLET ORAL
COMMUNITY

## 2025-03-11 RX ORDER — YOHIMBE BARK 500 MG
CAPSULE ORAL
COMMUNITY

## 2025-03-11 RX ORDER — ASPIRIN 325 MG
325 TABLET ORAL DAILY
COMMUNITY

## 2025-03-11 RX ORDER — VITAMIN B COMPLEX
1 CAPSULE ORAL DAILY
COMMUNITY
End: 2025-03-11

## 2025-03-11 RX ORDER — MULTIVITAMIN WITH IRON
500 TABLET ORAL DAILY
COMMUNITY

## 2025-03-11 NOTE — PATIENT INSTRUCTIONS
"Patient Education     Routine physical for adults   The Basics   Written by the doctors and editors at [facility]   What is a physical? -- A physical is a routine visit, or \"check-up,\" with your doctor. You might also hear it called a \"wellness visit\" or \"preventive visit.\"  During each visit, the doctor will:   Ask about your physical and mental health   Ask about your habits, behaviors, and lifestyle   Do an exam   Give you vaccines if needed   Talk to you about any medicines you take   Give advice about your health   Answer your questions  Getting regular check-ups is an important part of taking care of your health. It can help your doctor find and treat any problems you have. But it's also important for preventing health problems.  A routine physical is different from a \"sick visit.\" A sick visit is when you see a doctor because of a health concern or problem. Since physicals are scheduled ahead of time, you can think about what you want to ask the doctor.  How often should I get a physical? -- It depends on your age and health. In general, for people age 21 years and older:   If you are younger than 50 years, you might be able to get a physical every 3 years.   If you are 50 years or older, your doctor might recommend a physical every year.  If you have an ongoing health condition, like diabetes or high blood pressure, your doctor will probably want to see you more often.  What happens during a physical? -- In general, each visit will include:   Physical exam - The doctor or nurse will check your height, weight, heart rate, and blood pressure. They will also look at your eyes and ears. They will ask about how you are feeling and whether you have any symptoms that bother you.   Medicines - It's a good idea to bring a list of all the medicines you take to each doctor visit. Your doctor will talk to you about your medicines and answer any questions. Tell them if you are having any side effects that bother you. You " "should also tell them if you are having trouble paying for any of your medicines.   Habits and behaviors - This includes:   Your diet   Your exercise habits   Whether you smoke, drink alcohol, or use drugs   Whether you are sexually active   Whether you feel safe at home  Your doctor will talk to you about things you can do to improve your health and lower your risk of health problems. They will also offer help and support. For example, if you want to quit smoking, they can give you advice and might prescribe medicines. If you want to improve your diet or get more physical activity, they can help you with this, too.   Lab tests, if needed - The tests you get will depend on your age and situation. For example, your doctor might want to check your:   Cholesterol   Blood sugar   Iron level   Vaccines - The recommended vaccines will depend on your age, health, and what vaccines you already had. Vaccines are very important because they can prevent certain serious or deadly infections.   Discussion of screening - \"Screening\" means checking for diseases or other health problems before they cause symptoms. Your doctor can recommend screening based on your age, risk, and preferences. This might include tests to check for:   Cancer, such as breast, prostate, cervical, ovarian, colorectal, prostate, lung, or skin cancer   Sexually transmitted infections, such as chlamydia and gonorrhea   Mental health conditions like depression and anxiety  Your doctor will talk to you about the different types of screening tests. They can help you decide which screenings to have. They can also explain what the results might mean.   Answering questions - The physical is a good time to ask the doctor or nurse questions about your health. If needed, they can refer you to other doctors or specialists, too.  Adults older than 65 years often need other care, too. As you get older, your doctor will talk to you about:   How to prevent falling at " home   Hearing or vision tests   Memory testing   How to take your medicines safely   Making sure that you have the help and support you need at home  All topics are updated as new evidence becomes available and our peer review process is complete.  This topic retrieved from [employer] on: May 02, 2024.  Topic 265264 Version 1.0  Release: 32.4.3 - C32.122  © 2024 UpToDate, Inc. and/or its affiliates. All rights reserved.  Consumer Information Use and Disclaimer   Disclaimer: This generalized information is a limited summary of diagnosis, treatment, and/or medication information. It is not meant to be comprehensive and should be used as a tool to help the user understand and/or assess potential diagnostic and treatment options. It does NOT include all information about conditions, treatments, medications, side effects, or risks that may apply to a specific patient. It is not intended to be medical advice or a substitute for the medical advice, diagnosis, or treatment of a health care provider based on the health care provider's examination and assessment of a patient's specific and unique circumstances. Patients must speak with a health care provider for complete information about their health, medical questions, and treatment options, including any risks or benefits regarding use of medications. This information does not endorse any treatments or medications as safe, effective, or approved for treating a specific patient. UpToDate, Inc. and its affiliates disclaim any warranty or liability relating to this information or the use thereof.The use of this information is governed by the Terms of Use, available at https://www.woltersin2appsuwer.com/en/know/clinical-effectiveness-terms. 2024© UpToDate, Inc. and its affiliates and/or licensors. All rights reserved.  Copyright   © 2024 Up[employer]Date, Inc. and/or its affiliates. All rights reserved.    Patient Education     Diet and health   The Basics   Written by the doctors and  "editors at [employer]   Why is it important to eat a healthy diet? -- It's important to eat a healthy diet because eating the right foods can keep you healthy now and later in life. It can lower the risk of problems like heart disease, diabetes, high blood pressure, and some types of cancer. It can also help you live longer and improve your quality of life.  What kind of diet is best? -- There is no 1 specific diet that experts recommend for everyone. People choose what foods to eat for many different reasons. These include personal preference, culture, Judaism, allergies or intolerances, and nutritional goals. People also need to consider the cost and availability of different foods.  In general, experts recommend a diet that:   Includes lots of vegetables, fruits, beans, nuts, and whole grains   Limits red and processed meats, unhealthy fats, sugar, salt, and alcohol  What are dietary patterns? -- A dietary \"pattern\" means generally eating certain types of foods while limiting others. Some people need to follow a specific dietary pattern because of their health needs. For example, if you have high blood pressure, your doctor might recommend a diet low in salt.  If you are trying to improve your health in general, choosing a healthy dietary pattern can help. This does not have to mean being very strict about what you eat or avoid. The goal is to think about getting plenty of healthy foods while limiting less healthy foods.  Examples of dietary patterns include:   Mediterranean diet - This involves eating a lot of fruits, vegetables, nuts, and whole grains, and uses olive oil instead of other fats. It also includes some fish, poultry, and dairy products, but not a lot of red meat. Following this diet can help your overall health, and might even lower your risk of having a stroke.   Plant-based diets - These patterns focus on vegetables, fruits, grains, beans, and nuts. They limit or avoid food that comes from " "animals, such as meat and dairy. There are different types of plant-based diets, including vegetarian and vegan.   Low-fat diet - A low-fat diet involves limiting calories from fat. This might help some people keep weight off if that is their goal, but it does not have many other health benefits. If you choose to follow a low-fat diet, it is also important to focus on getting lots of whole grains, legumes, fruits, and vegetables. Limit refined grains and sugar.   Low-cholesterol diet - Cholesterol is found in foods with a lot of saturated fat, like red meat, butter, and cheese. A low-cholesterol diet focuses on limiting the amount of cholesterol that you eat. Limiting the cholesterol in your diet can also help lower the amount of unhealthy fats that you eat.  Which foods are especially healthy? -- Foods that are especially healthy include:   Fruits and vegetables - Eating a diet with lots of fruits and vegetables can help prevent heart disease and stroke. It might also help prevent certain types of cancer. Try to eat fruits and vegetables at each meal and also for snacks. If you don't have fresh fruits and vegetables available, you can eat frozen or canned ones instead. Doctors recommend eating at least 5 servings of fruits or vegetables each day.   Whole grains - Whole-grain foods include 100 percent whole-wheat bread, steel cut oats, and whole-grain pasta. These are healthier than foods made with \"refined\" grains, like white bread and white rice. Eating lots of whole grains instead of refined grains has been shown to help with weight control. It can also lower the risk of several health problems, including colon cancer, heart disease, and diabetes. Doctors recommend that most people try to eat 5 to 8 servings of whole-grain, high-fiber foods each day.   Foods with fiber - Eating foods with a lot of fiber can help prevent heart disease and stroke. If you have type 2 diabetes, it can also help control your blood " "sugar. Foods that have a lot of fiber include vegetables, fruits, beans, nuts, oatmeal, and whole-grain breads and cereals. You can tell how much fiber is in a food by reading the nutrition label (figure 1). Doctors recommend that most people eat about 25 to 34 grams of fiber each day.   Foods with calcium and vitamin D - Babies, children, and adults need calcium and vitamin D to help keep their bones strong. Adults also need calcium and vitamin D to help prevent osteoporosis. Osteoporosis is a condition that causes bones to get \"thin\" and break more easily than usual. Different foods and drinks have calcium and vitamin D in them (figure 2). People who don't get enough calcium and vitamin D in their diet might need to take a supplement. Doctors recommend that most people have 2 to 3 servings of foods with calcium and vitamin D each day.   Foods with protein - Protein helps your muscles and bones stay strong. Healthy foods with a lot of protein include chicken, fish, eggs, beans, nuts, and soy products. Red meat also has a lot of protein, but it also contains fats, which can be unhealthy. Doctors recommend that most people try to eat about 5 servings of protein each day.   Healthy fats - There are different types of fats. Some types of fats are better for your body than others. Healthy fats are \"monounsaturated\" or \"polyunsaturated\" fats. These are found in fatty fish, nuts and nut butters, and avocados. Use plant-based oils when cooking. Examples of these oils include olive, canola, safflower, sunflower, and corn oil. Eating foods with healthy fats, while avoiding or limiting foods with unhealthy fats, might lower the risk of heart disease.   Foods with folate - Folate is a vitamin that is important for pregnant people, since it helps prevent certain birth defects. It is also called \"folic acid.\" Anyone who could get pregnant should get at least 400 micrograms of folic acid daily, whether or not they are actively " "trying to get pregnant. Folate is found in many breakfast cereals, oranges, orange juice, and green leafy vegetables.  What foods should I avoid or limit? -- To eat a healthy diet, there are some things that you should avoid or limit. They include:   Unhealthy fats - \"Trans\" fats are especially unhealthy. They are found in margarines, many fast foods, and some store-bought baked goods. \"Saturated\" fats are found in animal products like meats, egg yolks, butter, cheese, and full-fat milk products. Unhealthy fats can raise your cholesterol level and increase your chance of getting heart disease.   Sugar - To have a healthy diet, it's important to limit or avoid added sugar, sweets, and refined grains. Refined grains are found in white bread, white rice, most pastas, and most packaged \"snack\" foods.  Avoiding sugar-sweetened beverages, like soda and sports drinks, can also help improve your health.  Avoid canned fruits in \"heavy\" syrup.   Red and processed meats - Studies have shown that eating a lot of red meat can increase your risk of certain health problems, including heart disease and cancer. You should limit the amount of red meat that you eat. This is also true for processed meats like sausage, hot dogs, and gardiner.  Can I drink alcohol as part of a healthy diet? -- Not drinking alcohol at all is the healthiest choice. Regular drinking can raise a person's chances of getting liver disease and certain types of cancers. In females, even 1 drink a day can increase the risk of getting breast cancer.  If you do choose to drink, most doctors recommend limiting alcohol to no more than:   1 drink a day for females   2 drinks a day for males  The limits are different because, generally, the female body takes longer to break down alcohol.  How many calories do I need each day? -- Calories give your body energy. The number of calories that you need each day depends on your weight, height, age, sex, and how active you " "are.  Your doctor or nurse can tell you about how many calories you should eat each day. You can also work with a dietitian (nutrition expert) to learn more about your dietary needs and options.  What if I am having trouble improving my diet? -- It can be hard to change the way that you eat. Remember that even small changes can improve your health.  Here are some tips that might help:   Try to make fruits and vegetables part of every meal. If you don't have fresh fruits and vegetables, frozen or canned are good options. Look for products without added salt or sugar.   Keep a bowl of fruit out for snacking.   When you can, choose whole grains instead of refined grains. Choose chicken, fish, and beans instead of red meat and cheese.   Try to eat prepared and processed foods less often.   Try flavored seltzer or water instead of soda or juice.   When eating at fast food restaurants, look for healthier items, like broiled chicken or salad.  If you have questions about which foods you should or should not eat, ask your doctor, nurse, or dietitian. The right diet for you will depend, in part, on your health and any medical conditions you have.  All topics are updated as new evidence becomes available and our peer review process is complete.  This topic retrieved from [employer] on: Feb 28, 2024.  Topic 22025 Version 28.0  Release: 32.2.4 - C32.58  © 2024 UpToDate, Inc. and/or its affiliates. All rights reserved.  figure 1: Nutrition label - Fiber     This is an example of a nutrition label. To figure out how much fiber is in a food, look for the line that says \"Dietary Fiber.\" It's also important to look at the serving size. This food has 7 grams of fiber in each serving, and each serving is 1 cup.  Graphic 56413 Version 8.0  figure 2: Foods and drinks with calcium and vitamin D     Foods rich in calcium include ice cream, soy milk, breads, kale, broccoli, milk, cheese, cottage cheese, almonds, yogurt, ready-to-eat cereals, " "beans, and tofu. Foods rich in vitamin D include milk, fortified plant-based \"milks\" (soy, almond), canned tuna fish, cod liver oil, yogurt, ready-to-eat-cereals, cooked salmon, canned sardines, mackerel, and eggs. Some of these foods are rich in both.  Graphic 30958 Version 4.0  Consumer Information Use and Disclaimer   Disclaimer: This generalized information is a limited summary of diagnosis, treatment, and/or medication information. It is not meant to be comprehensive and should be used as a tool to help the user understand and/or assess potential diagnostic and treatment options. It does NOT include all information about conditions, treatments, medications, side effects, or risks that may apply to a specific patient. It is not intended to be medical advice or a substitute for the medical advice, diagnosis, or treatment of a health care provider based on the health care provider's examination and assessment of a patient's specific and unique circumstances. Patients must speak with a health care provider for complete information about their health, medical questions, and treatment options, including any risks or benefits regarding use of medications. This information does not endorse any treatments or medications as safe, effective, or approved for treating a specific patient. Up[employer]Date, Inc. and its affiliates disclaim any warranty or liability relating to this information or the use thereof.The use of this information is governed by the Terms of Use, available at https://www.woltersSkyworduwer.com/en/know/clinical-effectiveness-terms. 2024© UpToDate, Inc. and its affiliates and/or licensors. All rights reserved.  Copyright   © 2024 Up[employer]Date, Inc. and/or its affiliates. All rights reserved.    Patient Education     Exercise and movement   The Basics   Written by the doctors and editors at [employer]   What are the benefits of movement? -- Moving your body has many benefits. It can:   Burn calories, which helps people " manage their weight   Help control blood sugar levels in people with diabetes   Lower blood pressure, especially in people with high blood pressure   Lower stress, and help with depression and anxiety   Keep bones strong, so they don't get thin and break easily   Lower the chance of dying from heart disease  Adding even small amounts of physical activity to your daily routine can improve your health.  What are the main types of exercise? -- There are 3 main types of exercise:   Aerobic exercise - This raises your heart rate. Examples include walking, running, dancing, riding a bike, and swimming.   Muscle strengthening - This helps make your muscles stronger. You can do it using weights, exercise bands, or weight machines. You can also use your own body weight, as with push-ups, or by lifting items in your home, like jugs of water.   Stretching - These help your muscles and joints move more easily.  It's important to have all 3 types in your exercise program. That way, your body, muscles, and joints can be as healthy as possible.  Should I talk to my doctor or nurse before I start exercising? -- If you have not exercised before or have not exercised in a long time, talk with your doctor or nurse before you start a very active exercise program.  If you have heart disease or risk factors for heart disease (like high blood pressure or diabetes), your doctor or nurse might recommend that you have an exercise test before starting an exercise program.  When you begin an exercise program, start slowly. For example, do the exercise at a slow pace or for a few minutes only. Over time, you can exercise faster and for longer periods of time.  What should I do when I exercise? -- Each time you exercise, you should:   Warm up - This can help keep you from hurting your muscles when you exercise. To warm up, do a light aerobic exercise (such as walking slowly) or stretch for 5 to 10 minutes.   Work out - Try to get a mix of  aerobic exercise, muscle strengthening, and stretching. During an aerobic workout, you can walk fast, swim, run, or use an exercise machine, for example. Other activities, like dancing or playing tennis, are also forms of aerobic exercise. You should also take time to stretch all of your joints, including your neck, shoulders, back, hips, and knees. At least 2 times a week, you can do muscle strengthening exercises as part of your workout.   Cool down - This helps keep you from feeling dizzy after you exercise and helps prevent muscle cramps. To cool down, you can stretch or do a light aerobic exercise for 5 minutes.  Some people go to a gym or do group exercise classes. But you can exercise even without these things. Some exercises can be done even in a small space. You can also try online videos or smartphone apps to get ideas for different types of exercise.  How often should I exercise? -- Doctors recommend that people exercise at least 30 minutes a day, on 5 or more days of the week.  If you can't exercise for 30 minutes straight, try to exercise for 10 minutes at a time, 3 or 4 times a day. Even exercising for shorter amounts of time is good for you, especially if it means spending less time sitting.  When should I call my doctor or nurse? -- If you have any of the following symptoms when you exercise, stop exercising and call your doctor or nurse right away:   Pain or pressure in your chest, arms, throat, jaw, or back   Nausea or vomiting   Feeling like your heart is fluttering or racing very fast   Feeling dizzy or faint  What if I don't have time to exercise? -- Many people have very busy lives and might not think that they have time to exercise. But it's important to try to find time, even if you are tired or work a lot. Exercise can increase your energy level, which can make you feel better and might even help you get more work done.  Even if it's hard to set aside a lot of time to exercise, you can still  improve your health by moving your body more. There are many ways that you can be more active. For example, you can:   Take the stairs instead of the elevator.   Park in a parking space that is farther away from the door.   Take a longer route when you walk from one place to another.  Spending a lot of time sitting still (for example, watching TV or working on the computer) is bad for your health. Try to get up and move around whenever you can. Even small amounts of movement, like taking short walks, doing household chores, or gardening, can improve your health. Finding activities that you enjoy, or doing them with other people, can help you add more movement into your daily life.  What else should I do when I exercise? -- To exercise safely and avoid problems, it's important to:   Drink fluids during and after exercising (but avoid drinks with a lot of caffeine or sugar).   Avoid exercising outside if it is too hot or cold.   Wear layers of clothes, so that you can take them off if you get too hot.   Wear shoes that fit well and support your feet.   Be aware of your surroundings if you exercise outside.  All topics are updated as new evidence becomes available and our peer review process is complete.  This topic retrieved from [employer] on: May 18, 2024.  Topic 12101 Version 31.0  Release: 32.4.3 - C32.137  © 2024 UpToDate, Inc. and/or its affiliates. All rights reserved.  Consumer Information Use and Disclaimer   Disclaimer: This generalized information is a limited summary of diagnosis, treatment, and/or medication information. It is not meant to be comprehensive and should be used as a tool to help the user understand and/or assess potential diagnostic and treatment options. It does NOT include all information about conditions, treatments, medications, side effects, or risks that may apply to a specific patient. It is not intended to be medical advice or a substitute for the medical advice, diagnosis, or  treatment of a health care provider based on the health care provider's examination and assessment of a patient's specific and unique circumstances. Patients must speak with a health care provider for complete information about their health, medical questions, and treatment options, including any risks or benefits regarding use of medications. This information does not endorse any treatments or medications as safe, effective, or approved for treating a specific patient. [employer], Inc[employer] and its affiliates disclaim any warranty or liability relating to this information or the use thereof.The use of this information is governed by the Terms of Use, available at https://www.[employer].com/en/know/clinical-effectiveness-terms. 2024© [employer] and its affiliates and/or licensors. All rights reserved.  Copyright   © 2024 [employer] and/or its affiliates. All rights reserved.

## 2025-03-11 NOTE — PROGRESS NOTES
Adult Annual Physical  Name: Binh Mercado      : 1960      MRN: [id number]  Encounter Provider: Alex De La Rosa DO  Encounter Date: 3/11/2025   Encounter department: [facility] PRIMARY CARE    Assessment & Plan  Annual physical exam  Is doing well.  Continue healthy lifestyle.  Continue work on diet exercise and weight loss.  Follow-up in 6 months.       BMI 26.0-26.9,adult           Preventive Screenings:  - Diabetes Screening: screening up-to-date  - Cholesterol Screening: screening up-to-date   - Hepatitis C screening: screening up-to-date   - HIV screening: screening up-to-date   - Cervical cancer screening: screening not indicated   - Colon cancer screening: screening up-to-date   - Lung cancer screening: screening not indicated   - Prostate cancer screening: screening up-to-date   - AAA screening: screening not indicated     Immunizations:  - Immunizations due: Influenza, Prevnar 20, Tdap and Zoster (Shingrix)         History of Present Illness     Adult Annual Physical:  Patient presents for annual physical.     Diet and Physical Activity:  - Diet/Nutrition: well balanced diet.  - Exercise: moderate cardiovascular exercise.    General Health:  - Sleep: sleeps well.  - Hearing: normal hearing right ear.  - Vision: no vision problems.  - Dental: regular dental visits.     Health:  - History of STDs: no.   - Urinary symptoms: none.     Advanced Care Planning:  - Has an advanced directive?: no    - Has a durable medical POA?: no    - ACP document given to patient?: no      Review of Systems   Constitutional: Negative.    HENT: Negative.     Eyes: Negative.    Respiratory: Negative.     Cardiovascular: Negative.    Gastrointestinal: Negative.    Endocrine: Negative.    Genitourinary: Negative.    Musculoskeletal: Negative.    Skin: Negative.    Allergic/Immunologic: Negative.    Neurological: Negative.    Hematological: Negative.    Psychiatric/Behavioral: Negative.     All other systems  "reviewed and are negative.        Objective   /80 (BP Location: Left arm, Patient Position: Sitting, Cuff Size: Large)   Pulse (!) 45   Temp (!) 97.3 °F (36.3 °C) (Tympanic)   Resp 16   Ht 6' 3\" (1.905 m)   Wt 95.8 kg (211 lb 3.2 oz)   SpO2 97%   BMI 26.40 kg/m²     Physical Exam  Vitals and nursing note reviewed.   Constitutional:       Appearance: Normal appearance. He is well-developed.   HENT:      Head: Normocephalic.      Nose: Nose normal.   Eyes:      Conjunctiva/sclera: Conjunctivae normal.      Pupils: Pupils are equal, round, and reactive to light.   Cardiovascular:      Rate and Rhythm: Normal rate and regular rhythm.      Pulses: Normal pulses.      Heart sounds: Normal heart sounds.   Pulmonary:      Effort: Pulmonary effort is normal.      Breath sounds: Normal breath sounds.   Abdominal:      General: Abdomen is flat. Bowel sounds are normal.      Palpations: Abdomen is soft.   Musculoskeletal:         General: Normal range of motion.      Cervical back: Normal range of motion and neck supple.   Skin:     General: Skin is warm and dry.   Neurological:      General: No focal deficit present.      Mental Status: He is alert and oriented to person, place, and time.   Psychiatric:         Mood and Affect: Mood normal.         Behavior: Behavior normal.         Thought Content: Thought content normal.         Judgment: Judgment normal.         "

## 2025-07-16 ENCOUNTER — OFFICE VISIT (OUTPATIENT)
Age: 65
End: 2025-07-16

## 2025-07-16 VITALS
HEART RATE: 50 BPM | DIASTOLIC BLOOD PRESSURE: 82 MMHG | HEIGHT: 75 IN | WEIGHT: 206 LBS | BODY MASS INDEX: 25.61 KG/M2 | SYSTOLIC BLOOD PRESSURE: 136 MMHG

## 2025-07-16 DIAGNOSIS — Z02.89 ENCOUNTER FOR FEDERAL AVIATION ADMINISTRATION (FAA) EXAMINATION: Primary | ICD-10-CM

## 2025-07-16 PROCEDURE — 93000 ELECTROCARDIOGRAM COMPLETE: CPT | Performed by: FAMILY MEDICINE

## 2025-07-16 PROCEDURE — 99499FA: Performed by: FAMILY MEDICINE

## 2025-07-16 NOTE — PROGRESS NOTES
"Name: Binh Mercado      : 1960      MRN: [id number]  Encounter Provider: Alex De La Rosa DO  Encounter Date: 2025   Encounter department: [facility] PRIMARY CARE  :  Assessment & Plan  Encounter for [employer] ([employer]) examination    Orders:  •  POCT ECG          Depression Screening and Follow-up Plan: Patient was screened for depression during today's encounter. They screened negative with a PHQ-2 score of 0.        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    HENT: Negative.     Eyes: Negative.    Respiratory: Negative.     Cardiovascular: Negative.    Gastrointestinal: Negative.    Endocrine: Negative.    Genitourinary: Negative.    Musculoskeletal: Negative.    Skin: Negative.    Allergic/Immunologic: Negative.    Neurological: Negative.    Hematological: Negative.    Psychiatric/Behavioral: Negative.     All other systems reviewed and are negative.      Objective   /82 (BP Location: Left arm, Patient Position: Sitting, Cuff Size: Large)   Pulse (!) 50   Ht 6' 3\" (1.905 m)   Wt 93.4 kg (206 lb)   BMI 25.75 kg/m²      Physical Exam  Vitals and nursing note reviewed.   Constitutional:       Appearance: Normal appearance. He is well-developed.   HENT:      Head: Normocephalic.      Nose: Nose normal.     Eyes:      Conjunctiva/sclera: Conjunctivae normal.      Pupils: Pupils are equal, round, and reactive to light.       Cardiovascular:      Rate and Rhythm: Normal rate and regular rhythm.      Pulses: Normal pulses.      Heart sounds: Normal heart sounds.   Pulmonary:      Effort: Pulmonary effort is normal.      Breath sounds: Normal breath sounds.   Abdominal:      General: Abdomen is flat. Bowel sounds are normal.      Palpations: Abdomen is soft.     Musculoskeletal:         General: Normal range of motion.      Cervical back: Normal range of " motion and neck supple.     Skin:     General: Skin is warm and dry.     Neurological:      General: No focal deficit present.      Mental Status: He is alert and oriented to person, place, and time.     Psychiatric:         Mood and Affect: Mood normal.         Behavior: Behavior normal.         Thought Content: Thought content normal.         Judgment: Judgment normal.